# Patient Record
Sex: MALE | Race: BLACK OR AFRICAN AMERICAN | Employment: OTHER | URBAN - METROPOLITAN AREA
[De-identification: names, ages, dates, MRNs, and addresses within clinical notes are randomized per-mention and may not be internally consistent; named-entity substitution may affect disease eponyms.]

---

## 2020-07-16 ENCOUNTER — HOSPITAL ENCOUNTER (OUTPATIENT)
Dept: ULTRASOUND IMAGING | Age: 50
Discharge: HOME | End: 2020-07-16
Payer: COMMERCIAL

## 2020-07-16 PROCEDURE — 76705 ECHO EXAM OF ABDOMEN: CPT

## 2020-07-24 ENCOUNTER — OFFICE VISIT (OUTPATIENT)
Dept: PRIMARY CARE CLINIC | Age: 50
End: 2020-07-24
Payer: COMMERCIAL

## 2020-07-24 ENCOUNTER — APPOINTMENT (OUTPATIENT)
Dept: CT IMAGING | Age: 50
DRG: 469 | End: 2020-07-24
Payer: COMMERCIAL

## 2020-07-24 ENCOUNTER — HOSPITAL ENCOUNTER (INPATIENT)
Age: 50
LOS: 3 days | Discharge: HOME | DRG: 469 | End: 2020-07-27
Attending: EMERGENCY MEDICINE | Admitting: INTERNAL MEDICINE
Payer: COMMERCIAL

## 2020-07-24 ENCOUNTER — HOSPITAL ENCOUNTER (OUTPATIENT)
Dept: GENERAL RADIOLOGY | Age: 50
Discharge: HOME | DRG: 469 | End: 2020-07-24
Payer: COMMERCIAL

## 2020-07-24 ENCOUNTER — HOSPITAL ENCOUNTER (OUTPATIENT)
Age: 50
Discharge: HOME | DRG: 469 | End: 2020-07-24
Payer: COMMERCIAL

## 2020-07-24 VITALS
BODY MASS INDEX: 24.9 KG/M2 | HEART RATE: 60 BPM | HEIGHT: 74 IN | DIASTOLIC BLOOD PRESSURE: 90 MMHG | WEIGHT: 194 LBS | SYSTOLIC BLOOD PRESSURE: 144 MMHG | TEMPERATURE: 97.3 F | OXYGEN SATURATION: 99 %

## 2020-07-24 DIAGNOSIS — R32 URINARY INCONTINENCE, UNSPECIFIED TYPE: ICD-10-CM

## 2020-07-24 DIAGNOSIS — R35.89 POLYURIA: ICD-10-CM

## 2020-07-24 DIAGNOSIS — R10.30 LOWER ABDOMINAL PAIN: ICD-10-CM

## 2020-07-24 DIAGNOSIS — Z82.61 FAMILY HISTORY OF RHEUMATOID ARTHRITIS: ICD-10-CM

## 2020-07-24 DIAGNOSIS — M25.449 SWELLING OF JOINT OF HAND, UNSPECIFIED LATERALITY: ICD-10-CM

## 2020-07-24 PROBLEM — N40.1 BENIGN NODULAR PROSTATIC HYPERPLASIA WITH LOWER URINARY TRACT SYMPTOMS: Status: ACTIVE | Noted: 2017-02-16

## 2020-07-24 PROBLEM — Z87.898 HISTORY OF ELEVATED PSA: Status: ACTIVE | Noted: 2020-07-24

## 2020-07-24 PROBLEM — N17.9 AKI (ACUTE KIDNEY INJURY) (HCC): Status: ACTIVE | Noted: 2020-07-24

## 2020-07-24 PROBLEM — R73.03 PRE-DIABETES: Status: ACTIVE | Noted: 2019-07-31

## 2020-07-24 LAB
A/G RATIO: 1.6 (ref 1.1–2.2)
ALBUMIN SERPL-MCNC: 4.4 G/DL (ref 3.4–5)
ALP BLD-CCNC: 63 U/L (ref 40–129)
ALT SERPL-CCNC: 12 U/L (ref 10–40)
ANION GAP SERPL CALCULATED.3IONS-SCNC: 11 MMOL/L (ref 3–16)
ANTI-NUCLEAR ANTIBODY (ANA): NEGATIVE
AST SERPL-CCNC: 20 U/L (ref 15–37)
BILIRUB SERPL-MCNC: <0.2 MG/DL (ref 0–1)
BILIRUBIN URINE: NEGATIVE
BLOOD, URINE: NEGATIVE
BUN BLDV-MCNC: 35 MG/DL (ref 7–20)
C-REACTIVE PROTEIN: 1.3 MG/L (ref 0–5.1)
CALCIUM SERPL-MCNC: 9.1 MG/DL (ref 8.3–10.6)
CHLORIDE BLD-SCNC: 104 MMOL/L (ref 99–110)
CLARITY: CLEAR
CO2: 23 MMOL/L (ref 21–32)
COLOR: YELLOW
CREAT SERPL-MCNC: 3.4 MG/DL (ref 0.9–1.3)
EPITHELIAL CELLS, UA: 0 /HPF (ref 0–5)
GFR AFRICAN AMERICAN: 23
GFR NON-AFRICAN AMERICAN: 19
GLOBULIN: 2.8 G/DL
GLUCOSE BLD-MCNC: 81 MG/DL (ref 70–99)
GLUCOSE URINE: NEGATIVE MG/DL
HYALINE CASTS: 0 /LPF (ref 0–8)
KETONES, URINE: NEGATIVE MG/DL
LEUKOCYTE ESTERASE, URINE: ABNORMAL
MICROSCOPIC EXAMINATION: YES
NITRITE, URINE: NEGATIVE
PH UA: 6 (ref 5–8)
POTASSIUM SERPL-SCNC: 4.9 MMOL/L (ref 3.5–5.1)
PROTEIN UA: NEGATIVE MG/DL
RBC UA: 3 /HPF (ref 0–4)
RHEUMATOID FACTOR: <10 IU/ML
SODIUM BLD-SCNC: 138 MMOL/L (ref 136–145)
SPECIFIC GRAVITY UA: 1.01 (ref 1–1.03)
TOTAL PROTEIN: 7.2 G/DL (ref 6.4–8.2)
URINE TYPE: ABNORMAL
UROBILINOGEN, URINE: 0.2 E.U./DL
WBC UA: 6 /HPF (ref 0–5)

## 2020-07-24 PROCEDURE — G8427 DOCREV CUR MEDS BY ELIG CLIN: HCPCS | Performed by: FAMILY MEDICINE

## 2020-07-24 PROCEDURE — 3017F COLORECTAL CA SCREEN DOC REV: CPT | Performed by: FAMILY MEDICINE

## 2020-07-24 PROCEDURE — 99204 OFFICE O/P NEW MOD 45 MIN: CPT | Performed by: FAMILY MEDICINE

## 2020-07-24 PROCEDURE — 99285 EMERGENCY DEPT VISIT HI MDM: CPT

## 2020-07-24 PROCEDURE — 6360000002 HC RX W HCPCS: Performed by: PHYSICIAN ASSISTANT

## 2020-07-24 PROCEDURE — 96375 TX/PRO/DX INJ NEW DRUG ADDON: CPT

## 2020-07-24 PROCEDURE — 74019 RADEX ABDOMEN 2 VIEWS: CPT

## 2020-07-24 PROCEDURE — G8419 CALC BMI OUT NRM PARAM NOF/U: HCPCS | Performed by: FAMILY MEDICINE

## 2020-07-24 PROCEDURE — 74176 CT ABD & PELVIS W/O CONTRAST: CPT

## 2020-07-24 PROCEDURE — 96374 THER/PROPH/DIAG INJ IV PUSH: CPT

## 2020-07-24 PROCEDURE — 1200000000 HC SEMI PRIVATE

## 2020-07-24 PROCEDURE — G0378 HOSPITAL OBSERVATION PER HR: HCPCS

## 2020-07-24 PROCEDURE — 51798 US URINE CAPACITY MEASURE: CPT

## 2020-07-24 PROCEDURE — 84153 ASSAY OF PSA TOTAL: CPT

## 2020-07-24 PROCEDURE — 4004F PT TOBACCO SCREEN RCVD TLK: CPT | Performed by: FAMILY MEDICINE

## 2020-07-24 RX ORDER — MORPHINE SULFATE 4 MG/ML
4 INJECTION, SOLUTION INTRAMUSCULAR; INTRAVENOUS ONCE
Status: COMPLETED | OUTPATIENT
Start: 2020-07-24 | End: 2020-07-24

## 2020-07-24 RX ORDER — POLYETHYLENE GLYCOL 3350 17 G/17G
17 POWDER, FOR SOLUTION ORAL DAILY PRN
Status: DISCONTINUED | OUTPATIENT
Start: 2020-07-24 | End: 2020-07-27 | Stop reason: HOSPADM

## 2020-07-24 RX ORDER — PROMETHAZINE HYDROCHLORIDE 25 MG/1
12.5 TABLET ORAL EVERY 6 HOURS PRN
Status: DISCONTINUED | OUTPATIENT
Start: 2020-07-24 | End: 2020-07-27 | Stop reason: HOSPADM

## 2020-07-24 RX ORDER — ONDANSETRON 2 MG/ML
4 INJECTION INTRAMUSCULAR; INTRAVENOUS EVERY 6 HOURS PRN
Status: DISCONTINUED | OUTPATIENT
Start: 2020-07-24 | End: 2020-07-27 | Stop reason: HOSPADM

## 2020-07-24 RX ORDER — TAMSULOSIN HYDROCHLORIDE 0.4 MG/1
CAPSULE ORAL
COMMUNITY
Start: 2019-08-27 | End: 2020-07-24 | Stop reason: ALTCHOICE

## 2020-07-24 RX ORDER — SODIUM CHLORIDE 0.9 % (FLUSH) 0.9 %
10 SYRINGE (ML) INJECTION PRN
Status: DISCONTINUED | OUTPATIENT
Start: 2020-07-24 | End: 2020-07-27 | Stop reason: HOSPADM

## 2020-07-24 RX ORDER — ACETAMINOPHEN 650 MG/1
650 SUPPOSITORY RECTAL EVERY 6 HOURS PRN
Status: DISCONTINUED | OUTPATIENT
Start: 2020-07-24 | End: 2020-07-27 | Stop reason: HOSPADM

## 2020-07-24 RX ORDER — SODIUM CHLORIDE 0.9 % (FLUSH) 0.9 %
10 SYRINGE (ML) INJECTION EVERY 12 HOURS SCHEDULED
Status: DISCONTINUED | OUTPATIENT
Start: 2020-07-25 | End: 2020-07-27 | Stop reason: HOSPADM

## 2020-07-24 RX ORDER — ONDANSETRON 2 MG/ML
4 INJECTION INTRAMUSCULAR; INTRAVENOUS ONCE
Status: COMPLETED | OUTPATIENT
Start: 2020-07-24 | End: 2020-07-24

## 2020-07-24 RX ORDER — ACETAMINOPHEN 325 MG/1
650 TABLET ORAL EVERY 6 HOURS PRN
Status: DISCONTINUED | OUTPATIENT
Start: 2020-07-24 | End: 2020-07-27 | Stop reason: HOSPADM

## 2020-07-24 RX ORDER — SODIUM CHLORIDE 9 MG/ML
INJECTION, SOLUTION INTRAVENOUS CONTINUOUS
Status: ACTIVE | OUTPATIENT
Start: 2020-07-25 | End: 2020-07-25

## 2020-07-24 RX ADMIN — ONDANSETRON 4 MG: 2 INJECTION INTRAMUSCULAR; INTRAVENOUS at 21:47

## 2020-07-24 RX ADMIN — MORPHINE SULFATE 4 MG: 4 INJECTION INTRAVENOUS at 21:47

## 2020-07-24 ASSESSMENT — PAIN DESCRIPTION - PAIN TYPE: TYPE: ACUTE PAIN

## 2020-07-24 ASSESSMENT — ENCOUNTER SYMPTOMS
RHINORRHEA: 0
ABDOMINAL PAIN: 0
NAUSEA: 0
VOMITING: 0
SHORTNESS OF BREATH: 0
SORE THROAT: 0
COUGH: 0

## 2020-07-24 ASSESSMENT — PAIN DESCRIPTION - LOCATION: LOCATION: ABDOMEN

## 2020-07-24 ASSESSMENT — PAIN SCALES - GENERAL
PAINLEVEL_OUTOF10: 4
PAINLEVEL_OUTOF10: 0
PAINLEVEL_OUTOF10: 10

## 2020-07-24 ASSESSMENT — PATIENT HEALTH QUESTIONNAIRE - PHQ9
2. FEELING DOWN, DEPRESSED OR HOPELESS: 0
1. LITTLE INTEREST OR PLEASURE IN DOING THINGS: 0
SUM OF ALL RESPONSES TO PHQ QUESTIONS 1-9: 0
SUM OF ALL RESPONSES TO PHQ QUESTIONS 1-9: 0
SUM OF ALL RESPONSES TO PHQ9 QUESTIONS 1 & 2: 0

## 2020-07-24 NOTE — PROGRESS NOTES
[address] [facility] [address] PRIMARY CARE  [address] [place] [id number]  Dept: [phone]  Dept Fax: [phone]     7/24/2020      Mesha Beth   1970     Chief Complaint   Patient presents with    Other     stomach issue       HPI  Pt comes in today as a NP to establish care. He is seemingly chronically healthy individual, but has been dealing with some \"abdominal issues\" for the last couple of months. He was working with his previous PCP office regarding this. He has been having pain in lower abd, mild bowel changes and increase urination. He has been getting up multiple times per night, and more recently actually reports urinary incontinence at night. He denies blood in urine, polyuria or fever. He had a RUQ sono done very recently and was told it was normal. In chart review the US shows:    Pancreas normal size and configuration. Pancreatic duct is borderline dilated at 3 mm.         Hepatic parenchymal echotexture is uniform. Minimal echogenic sludge is present within the gallbladder. No signs of cholelithiasis. No overlying probe tenderness elicited during examination         Common bile duct normal measuring 3 mm. Right kidney measures 14.7 cm in length with associated moderate hydronephrosis. No intrarenal calculus. In addition renal cortex is hyperechoic consistent with medical renal disease           Further chart review does reveal a slightly elevated PSA for years and had been previously on Flomax. When asked why he no longer takes this he reports it not working.     PHQ Scores 7/24/2020   PHQ2 Score 0   PHQ9 Score 0     Interpretation of Total Score Depression Severity: 1-4 = Minimal depression, 5-9 = Mild depression, 10-14 = Moderate depression, 15-19 = Moderately severe depression, 20-27 = Severe depression     Prior to Visit Medications    Not on File       Past Medical History:   Diagnosis Date    Alcohol abuse     Borderline diabetes     A1c 6.0 7/2019 Appearance: Normal appearance. He is well-developed and normal weight. HENT:      Head: Normocephalic and atraumatic. Right Ear: Tympanic membrane and ear canal normal. No drainage. No middle ear effusion. Tympanic membrane is not erythematous. Left Ear: Tympanic membrane and ear canal normal. No drainage. No middle ear effusion. Tympanic membrane is not erythematous. Nose: Nose normal. No rhinorrhea. Mouth/Throat:      Mouth: Mucous membranes are moist.      Pharynx: No oropharyngeal exudate or posterior oropharyngeal erythema. Eyes:      Extraocular Movements: Extraocular movements intact. Pupils: Pupils are equal, round, and reactive to light. Neck:      Musculoskeletal: Neck supple. Thyroid: No thyromegaly. Cardiovascular:      Rate and Rhythm: Normal rate and regular rhythm. Heart sounds: No murmur. Pulmonary:      Effort: Pulmonary effort is normal.      Breath sounds: Normal breath sounds. No wheezing. Abdominal:      General: Bowel sounds are normal. There is distension (mild, lower). Palpations: Abdomen is soft. There is no mass. Tenderness: There is abdominal tenderness (mild, lower). There is no right CVA tenderness, left CVA tenderness, guarding or rebound. Musculoskeletal: Normal range of motion. General: No swelling. Comments: Multiple enlarged joints to fingers bilateral hands   Lymphadenopathy:      Cervical: No cervical adenopathy. Skin:     General: Skin is warm and dry. Findings: No rash. Neurological:      General: No focal deficit present. Mental Status: He is alert and oriented to person, place, and time. Cranial Nerves: No cranial nerve deficit. Psychiatric:         Mood and Affect: Mood normal.         Assessment:  Encounter Diagnoses   Name Primary?     Lower abdominal pain Yes    Abdominal distention     Urinary incontinence, unspecified type     History of BPH     Pre-diabetes     Polyuria Renay Mchugh, DO     Please note that this chart was generated using dragon dictation software. Although every effort was made to ensure the accuracy of this automated transcription, some errors in transcription may have occurred.

## 2020-07-25 LAB
ANION GAP SERPL CALCULATED.3IONS-SCNC: 10 MMOL/L (ref 3–16)
BUN BLDV-MCNC: 31 MG/DL (ref 7–20)
CALCIUM SERPL-MCNC: 8.9 MG/DL (ref 8.3–10.6)
CHLORIDE BLD-SCNC: 108 MMOL/L (ref 99–110)
CO2: 25 MMOL/L (ref 21–32)
CREAT SERPL-MCNC: 2.9 MG/DL (ref 0.9–1.3)
GFR AFRICAN AMERICAN: 28
GFR NON-AFRICAN AMERICAN: 23
GLUCOSE BLD-MCNC: 94 MG/DL (ref 70–99)
POTASSIUM REFLEX MAGNESIUM: 4.3 MMOL/L (ref 3.5–5.1)
PROSTATE SPECIFIC ANTIGEN: 5.79 NG/ML (ref 0–4)
SODIUM BLD-SCNC: 143 MMOL/L (ref 136–145)
URINE CULTURE, ROUTINE: NORMAL

## 2020-07-25 PROCEDURE — 80048 BASIC METABOLIC PNL TOTAL CA: CPT

## 2020-07-25 PROCEDURE — 1200000000 HC SEMI PRIVATE

## 2020-07-25 PROCEDURE — 2580000003 HC RX 258: Performed by: INTERNAL MEDICINE

## 2020-07-25 PROCEDURE — 83036 HEMOGLOBIN GLYCOSYLATED A1C: CPT

## 2020-07-25 PROCEDURE — G0378 HOSPITAL OBSERVATION PER HR: HCPCS

## 2020-07-25 PROCEDURE — 36415 COLL VENOUS BLD VENIPUNCTURE: CPT

## 2020-07-25 PROCEDURE — 6370000000 HC RX 637 (ALT 250 FOR IP): Performed by: NURSE PRACTITIONER

## 2020-07-25 PROCEDURE — 6370000000 HC RX 637 (ALT 250 FOR IP): Performed by: INTERNAL MEDICINE

## 2020-07-25 PROCEDURE — 6360000002 HC RX W HCPCS: Performed by: INTERNAL MEDICINE

## 2020-07-25 RX ORDER — OXYBUTYNIN CHLORIDE 5 MG/1
5 TABLET ORAL 3 TIMES DAILY
Status: DISCONTINUED | OUTPATIENT
Start: 2020-07-25 | End: 2020-07-27

## 2020-07-25 RX ORDER — OXYBUTYNIN CHLORIDE 5 MG/1
5 TABLET ORAL 3 TIMES DAILY
Status: COMPLETED | OUTPATIENT
Start: 2020-07-25 | End: 2020-07-25

## 2020-07-25 RX ORDER — THIAMINE MONONITRATE (VIT B1) 100 MG
100 TABLET ORAL DAILY
Status: DISCONTINUED | OUTPATIENT
Start: 2020-07-25 | End: 2020-07-27 | Stop reason: HOSPADM

## 2020-07-25 RX ORDER — SODIUM CHLORIDE 9 MG/ML
INJECTION, SOLUTION INTRAVENOUS CONTINUOUS
Status: ACTIVE | OUTPATIENT
Start: 2020-07-25 | End: 2020-07-25

## 2020-07-25 RX ORDER — TAMSULOSIN HYDROCHLORIDE 0.4 MG/1
0.8 CAPSULE ORAL DAILY
Status: DISCONTINUED | OUTPATIENT
Start: 2020-07-25 | End: 2020-07-27 | Stop reason: HOSPADM

## 2020-07-25 RX ORDER — TAMSULOSIN HYDROCHLORIDE 0.4 MG/1
0.4 CAPSULE ORAL DAILY
Status: DISCONTINUED | OUTPATIENT
Start: 2020-07-25 | End: 2020-07-25

## 2020-07-25 RX ORDER — FOLIC ACID 1 MG/1
1 TABLET ORAL DAILY
Status: DISCONTINUED | OUTPATIENT
Start: 2020-07-25 | End: 2020-07-27 | Stop reason: HOSPADM

## 2020-07-25 RX ORDER — HYDRALAZINE HYDROCHLORIDE 20 MG/ML
10 INJECTION INTRAMUSCULAR; INTRAVENOUS EVERY 6 HOURS PRN
Status: DISCONTINUED | OUTPATIENT
Start: 2020-07-25 | End: 2020-07-27 | Stop reason: HOSPADM

## 2020-07-25 RX ORDER — MORPHINE SULFATE 2 MG/ML
2 INJECTION, SOLUTION INTRAMUSCULAR; INTRAVENOUS EVERY 4 HOURS PRN
Status: DISPENSED | OUTPATIENT
Start: 2020-07-25 | End: 2020-07-27

## 2020-07-25 RX ORDER — NICOTINE 21 MG/24HR
1 PATCH, TRANSDERMAL 24 HOURS TRANSDERMAL DAILY
Status: DISCONTINUED | OUTPATIENT
Start: 2020-07-25 | End: 2020-07-27 | Stop reason: HOSPADM

## 2020-07-25 RX ADMIN — FOLIC ACID 1 MG: 1 TABLET ORAL at 09:02

## 2020-07-25 RX ADMIN — SODIUM CHLORIDE: 9 INJECTION, SOLUTION INTRAVENOUS at 10:45

## 2020-07-25 RX ADMIN — SODIUM CHLORIDE: 9 INJECTION, SOLUTION INTRAVENOUS at 00:44

## 2020-07-25 RX ADMIN — HYDRALAZINE HYDROCHLORIDE 10 MG: 20 INJECTION INTRAMUSCULAR; INTRAVENOUS at 00:53

## 2020-07-25 RX ADMIN — OXYBUTYNIN CHLORIDE 5 MG: 5 TABLET ORAL at 23:06

## 2020-07-25 RX ADMIN — MORPHINE SULFATE 2 MG: 2 INJECTION, SOLUTION INTRAMUSCULAR; INTRAVENOUS at 01:11

## 2020-07-25 RX ADMIN — THIAMINE HCL TAB 100 MG 100 MG: 100 TAB at 09:02

## 2020-07-25 RX ADMIN — ENOXAPARIN SODIUM 30 MG: 30 INJECTION SUBCUTANEOUS at 09:02

## 2020-07-25 RX ADMIN — OXYBUTYNIN CHLORIDE 5 MG: 5 TABLET ORAL at 00:52

## 2020-07-25 RX ADMIN — OXYBUTYNIN CHLORIDE 5 MG: 5 TABLET ORAL at 14:44

## 2020-07-25 RX ADMIN — TAMSULOSIN HYDROCHLORIDE 0.8 MG: 0.4 CAPSULE ORAL at 10:23

## 2020-07-25 RX ADMIN — OXYBUTYNIN CHLORIDE 5 MG: 5 TABLET ORAL at 09:02

## 2020-07-25 ASSESSMENT — PAIN DESCRIPTION - FREQUENCY
FREQUENCY: CONTINUOUS

## 2020-07-25 ASSESSMENT — PAIN DESCRIPTION - PROGRESSION
CLINICAL_PROGRESSION: GRADUALLY IMPROVING
CLINICAL_PROGRESSION: GRADUALLY WORSENING
CLINICAL_PROGRESSION: GRADUALLY WORSENING

## 2020-07-25 ASSESSMENT — PAIN DESCRIPTION - PAIN TYPE
TYPE: ACUTE PAIN

## 2020-07-25 ASSESSMENT — PAIN DESCRIPTION - ORIENTATION
ORIENTATION: LOWER
ORIENTATION: LOWER;OTHER (COMMENT)
ORIENTATION: LOWER;OTHER (COMMENT)

## 2020-07-25 ASSESSMENT — PAIN DESCRIPTION - LOCATION
LOCATION: ABDOMEN

## 2020-07-25 ASSESSMENT — PAIN DESCRIPTION - DESCRIPTORS
DESCRIPTORS: SPASM

## 2020-07-25 ASSESSMENT — PAIN SCALES - GENERAL
PAINLEVEL_OUTOF10: 3
PAINLEVEL_OUTOF10: 10
PAINLEVEL_OUTOF10: 0
PAINLEVEL_OUTOF10: 5
PAINLEVEL_OUTOF10: 8
PAINLEVEL_OUTOF10: 0

## 2020-07-25 ASSESSMENT — PAIN - FUNCTIONAL ASSESSMENT
PAIN_FUNCTIONAL_ASSESSMENT: PREVENTS OR INTERFERES SOME ACTIVE ACTIVITIES AND ADLS
PAIN_FUNCTIONAL_ASSESSMENT: ACTIVITIES ARE NOT PREVENTED
PAIN_FUNCTIONAL_ASSESSMENT: PREVENTS OR INTERFERES SOME ACTIVE ACTIVITIES AND ADLS

## 2020-07-25 ASSESSMENT — PAIN DESCRIPTION - ONSET
ONSET: AWAKENED FROM SLEEP
ONSET: ON-GOING
ONSET: ON-GOING

## 2020-07-25 NOTE — PROGRESS NOTES
Admission: Patient received to room 6304 from ED. Patient admitted with Dx LATIA/URINARY RETENTION. Patient A&Ox 4 upon arrival.   No tele order. VSS~ /90. Pt c/o bladder spasm pain. Dr. Sam Aguilar notified via perfect serve. Patient oriented to room, staff, and call system. Educated on fall protocol and hourly rounding. Pt up ad janelle ~ steady gait. Assessment as documented. Admission navigator complete. IVF infusing @ 50, PIV in R FA. Flushed and blood return noted. Bed locked in low position. Patient informed to utilize call light with any needs. Pt verbalized understanding. Call light within reach. Hourly rounding in place. Will continue to monitor.
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
## 2020-07-25 NOTE — PROGRESS NOTES
4 Eyes Admission Assessment     I agree as the admission nurse that 2 RN's have performed a thorough Head to Toe Skin Assessment on the patient. ALL assessment sites listed below have been assessed on admission. Areas assessed by both nurses: yes  [x]   Head, Face, and Ears   [x]   Shoulders, Back, and Chest  [x]   Arms, Elbows, and Hands   [x]   Coccyx, Sacrum, and Ischum  [x]   Legs, Feet, and Heels        Does the Patient have Skin Breakdown?   No         Chad Prevention initiated:  No   Wound Care Orders initiated:  No      [facility] nurse consulted for Pressure Injury (Stage 3,4, Unstageable, DTI, NWPT, and Complex wounds):  No      Nurse 1 eSignature: Electronically signed by Audra Alex RN on 7/25/20 at 12:35 AM EDT    **SHARE this note so that the co-signing nurse is able to place an eSignature**    Nurse 2 eSignature: Electronically signed by Nadiya Fiore RN on 7/25/20 at 12:43 AM EDT

## 2020-07-25 NOTE — PLAN OF CARE
Problem: Pain:  Goal: Control of acute pain  Description: Control of acute pain  Outcome: Ongoing  Note: Pt c/o bladder spasm pain after herrera inserted in ED. Pt requests pain meds~ pt given IV morphine and PO ditropan. Pt expressed relief after pain meds. Will continue to assess. Problem: Tissue Perfusion - Renal, Altered:  Goal: Serum creatinine will be within specified parameters  Description: Serum creatinine will be within specified parameters  Outcome: Ongoing  Note: Creat 3.4 on admission. IVF infusing at 50/hr. Labs will be redrawn this am. Will continue to monitor. Problem: Urinary Elimination:  Goal: Will remain free from infection  Description: Will remain free from infection  Outcome: Ongoing  Note: Herrera inserted for urinary rentention, draining cherry/bloody clear urine. UA (-). Over 2 L of UOP since placement of hrerera. Urology consulted. Herrera care done q shift. Will continue to monitor.

## 2020-07-25 NOTE — PLAN OF CARE
Problem: Pain:  Goal: Pain level will decrease  Description: Pain level will decrease  Outcome: Ongoing  Note: Pt pain level has decreased throughout the shift. Pt has not required and PRN pain medications this shift. Pt endorses some tenderness and soreness in lower abdomen but says it is tolerable and does not require medication. Pt has been able to rest comfortably this shift. Problem: Tissue Perfusion - Renal, Altered:  Goal: Ability to achieve a balanced intake and output will improve  Description: Ability to achieve a balanced intake and output will improve  Note: Schmitz catheter in place. Pt has had large amounts of urine output this shift. Pt is also taking in large quantities of PO water. Urine has become more yellow and less pink this shift. Will continue to track I/Os. Problem: Urinary Elimination:  Goal: Ability to achieve a balanced intake and output will improve  Description: Ability to achieve a balanced intake and output will improve  Note: Schmitz catheter in place. Pt has had large amounts of urine output this shift. Pt is also taking in large quantities of PO water. Urine has become more yellow and less pink this shift. Will continue to track I/Os.

## 2020-07-25 NOTE — CONSULTS
Urology Attending Consult Note      Reason for Consultation: AUR, LATIA, Bilateral hydro. History: 43 yo M with h/o BPH. He was on flomax with PCP, but stopped this several months ago 2/ \"not working\". No f/u after this. He has not been seen by  in the past. He presents with low abdominal/suprapubic pain for the past 2 weeks. A herrera was placed for 1500cc. Cr @ 3.5 on admission. CT showed moderate bilateral hydro. Family History, Social History, Review of Systems:  Reviewed and agreed to as per chart    Vitals:  /87   Pulse 53   Temp 98.7 °F (37.1 °C) (Oral)   Resp 14   Ht 6' 2\" (1.88 m)   Wt 195 lb (88.5 kg)   SpO2 99%   BMI 25.04 kg/m²   Temp  Av.5 °F (36.9 °C)  Min: 98 °F (36.7 °C)  Max: 98.9 °F (37.2 °C)    Intake/Output Summary (Last 24 hours) at 2020 1151  Last data filed at 2020 0908  Gross per 24 hour   Intake 425.21 ml   Output 6625 ml   Net -6199.79 ml         Physical:   Well developed, well nourished in no acute distress   Mood indicates no abnormalities. Pt doesnt appear depressed   Orientated to time and place   Neck is supple, trachea is midline   Respiratory effort is normal   Cardiovascular show no extremity swelling   Abdomen no masses or hernias are palpated, there is no tenderness. Liver and Spleen appear normal.   Skin show no abnormal lesions   Lymph nodes are not palpated in the inguinal, neck, or axillary area.      Male :   Penis appears normal and circumcised   Urethral meatus is normal in size and location   Scrotum appears normal and both testicles appear normal in size and location   Herrera draining clear urine       Labs:  WBC:  No results found for: WBC  Hemoglobin/Hematocrit:  No results found for: HGB, HCT  BMP:    Lab Results   Component Value Date     2020    K 4.3 2020     2020    CO2 25 2020    BUN 31 2020    LABALBU 4.4 2020    CREATININE 2.9 2020    CALCIUM 8.9 07/25/2020    GFRAA 28 07/25/2020    LABGLOM 23 07/25/2020     PT/INR:  No results found for: PROTIME, INR  PTT:  No results found for: APTT[APTT        Urine Culture: NGTD        Antibiotic Therapy: None     Imaging: CT abd/pelvis 7/24/20     1.  Moderate prostatomegaly. Urinary bladder is partially nondistended with a Herrera catheter and demonstrates moderate wall thickening which may represent chronic bladder outlet obstruction or cystitis. 2.  Moderate bilateral hydroureteronephrosis without evidence of obstructing lesion likely secondary to bladder outlet obstruction. 3.  No evidence of renal or ureteral calculi.               Impression/Plan: 45 yo M with BPH, admitted with AUR, LATIA and bilateral hydro.     -herrera was placed for 1500cc. Urine clear   -Cr @ 2.9 from 3.5 with herrera.  Will check renal function in AM  -flomax 0.8mg started   -keep herrera for 4-5 days to allow flomax to take effect  -voiding trial as outpatient     Marquis Meneses, APRN - CNP

## 2020-07-25 NOTE — PROGRESS NOTES
[facility]    7/25/2020 10:17 AM        Name: Angella Foy . Admitted: 7/24/2020  Primary Care Provider: Luis A Navarro DO (Tel: [phone])    Brief Course:  Admitted with seema, bph, post renal renal failure  S/p herrera, urology following      CC: abdominal distension    Subjective:  . Patient feels better than yesterday  But still having some abdominal fullness and pain  No nausea, no vomiting   Blood pressure improve    Reviewed interval ancillary notes    Current Medications  hydrALAZINE (APRESOLINE) injection 10 mg, Q6H PRN  oxybutynin (DITROPAN) tablet 5 mg, TID  morphine (PF) injection 2 mg, Q4H PRN  vitamin B-1 (THIAMINE) tablet 060 mg, Daily  folic acid (FOLVITE) tablet 1 mg, Daily  nicotine (NICODERM CQ) 21 MG/24HR 1 patch, Daily  tamsulosin (FLOMAX) capsule 0.8 mg, Daily  sodium chloride flush 0.9 % injection 10 mL, 2 times per day  sodium chloride flush 0.9 % injection 10 mL, PRN  acetaminophen (TYLENOL) tablet 650 mg, Q6H PRN    Or  acetaminophen (TYLENOL) suppository 650 mg, Q6H PRN  polyethylene glycol (GLYCOLAX) packet 17 g, Daily PRN  promethazine (PHENERGAN) tablet 12.5 mg, Q6H PRN    Or  ondansetron (ZOFRAN) injection 4 mg, Q6H PRN  enoxaparin (LOVENOX) injection 30 mg, Daily        Objective:  /87   Pulse 53   Temp 98.7 °F (37.1 °C) (Oral)   Resp 14   Ht 6' 2\" (1.88 m)   Wt 195 lb (88.5 kg)   SpO2 99%   BMI 25.04 kg/m²     Intake/Output Summary (Last 24 hours) at 7/25/2020 1017  Last data filed at 7/25/2020 0908  Gross per 24 hour   Intake 425.21 ml   Output 6625 ml   Net -6199.79 ml      Wt Readings from Last 3 Encounters:   07/24/20 195 lb (88.5 kg)   07/24/20 194 lb (88 kg)   12/01/16 200 lb (90.7 kg)     Mild abdominal tenderness suprapubic in nature  General appearance:  Appears comfortable  Eyes: Sclera clear. Pupils equal.  ENT: Moist oral mucosa. Trachea midline, no adenopathy. Cardiovascular: Regular rhythm, normal S1, S2. No murmur. No edema in lower extremities  Respiratory: Not using accessory muscles. Good inspiratory effort. Clear to auscultation bilaterally, no wheeze or crackles. G, normal bowel sounds  Musculoskeletal: No cyanosis in digits, neck supple  Neurology: CN 2-12 grossly intact. No speech or motor deficits  Psych: Normal affect. Alert and oriented in time, place and person  Skin: Warm, dry, normal turgor  Extremity exam shows brisk capillary refill. Peripheral pulses are palpable in lower extremities     Labs and Tests:  CBC: No results for input(s): WBC, HGB, PLT in the last 72 hours. BMP:    Recent Labs     07/24/20  1045 07/25/20  0550    143   K 4.9 4.3    108   CO2 23 25   BUN 35* 31*   CREATININE 3.4* 2.9*   GLUCOSE 81 94     Hepatic:   Recent Labs     07/24/20  1045   AST 20   ALT 12   BILITOT <0.2   ALKPHOS 63     CT ABDOMEN PELVIS WO CONTRAST Additional Contrast? None   Final Result      1. Moderate prostatomegaly. Urinary bladder is partially nondistended with a Schmitz catheter and demonstrates moderate wall thickening which may represent chronic bladder outlet obstruction or cystitis. 2.  Moderate bilateral hydroureteronephrosis without evidence of obstructing lesion likely secondary to bladder outlet obstruction. 3.  No evidence of renal or ureteral calculi. Problem List  Active Problems:    LATIA (acute kidney injury) ([facility] [address]  Resolved Problems:    * No resolved hospital problems.  *       Assessment & Plan:   LATIA probably post obstructive  Improving   Urology consulted   Gentle hydration to keep up with the fluid loss     H/o BPH  Was not on flomax     Elevated blood pressure on the arrival   Improved, continue to monitor at this time     IV Access: peripheral  Schmitz: no  Diet: DIET GENERAL;  Code:Full Code  DVT PPX Lovenox, renal dosing  Disposition  Hopefully home tomorrow or day after, monitor the renal functions       Janice Blake MD   7/25/2020 10:17 AM

## 2020-07-25 NOTE — PROGRESS NOTES
Updated pt spouse over the phone. She would like an update from physician if she is not here during rounds.    [phone] [employer]

## 2020-07-26 LAB
ANION GAP SERPL CALCULATED.3IONS-SCNC: 11 MMOL/L (ref 3–16)
BUN BLDV-MCNC: 24 MG/DL (ref 7–20)
CALCIUM SERPL-MCNC: 9.5 MG/DL (ref 8.3–10.6)
CHLORIDE BLD-SCNC: 103 MMOL/L (ref 99–110)
CO2: 26 MMOL/L (ref 21–32)
CREAT SERPL-MCNC: 2.5 MG/DL (ref 0.9–1.3)
ESTIMATED AVERAGE GLUCOSE: 122.6 MG/DL
GFR AFRICAN AMERICAN: 33
GFR NON-AFRICAN AMERICAN: 27
GLUCOSE BLD-MCNC: 93 MG/DL (ref 70–99)
HBA1C MFR BLD: 5.9 %
POTASSIUM REFLEX MAGNESIUM: 4.4 MMOL/L (ref 3.5–5.1)
SODIUM BLD-SCNC: 140 MMOL/L (ref 136–145)

## 2020-07-26 PROCEDURE — 80048 BASIC METABOLIC PNL TOTAL CA: CPT

## 2020-07-26 PROCEDURE — 6370000000 HC RX 637 (ALT 250 FOR IP): Performed by: INTERNAL MEDICINE

## 2020-07-26 PROCEDURE — 36415 COLL VENOUS BLD VENIPUNCTURE: CPT

## 2020-07-26 PROCEDURE — 6370000000 HC RX 637 (ALT 250 FOR IP): Performed by: NURSE PRACTITIONER

## 2020-07-26 PROCEDURE — 2580000003 HC RX 258: Performed by: INTERNAL MEDICINE

## 2020-07-26 PROCEDURE — 6360000002 HC RX W HCPCS: Performed by: INTERNAL MEDICINE

## 2020-07-26 PROCEDURE — G0378 HOSPITAL OBSERVATION PER HR: HCPCS

## 2020-07-26 PROCEDURE — 1200000000 HC SEMI PRIVATE

## 2020-07-26 RX ORDER — SODIUM CHLORIDE 9 MG/ML
INJECTION, SOLUTION INTRAVENOUS CONTINUOUS
Status: DISCONTINUED | OUTPATIENT
Start: 2020-07-26 | End: 2020-07-27 | Stop reason: HOSPADM

## 2020-07-26 RX ADMIN — ENOXAPARIN SODIUM 40 MG: 40 INJECTION SUBCUTANEOUS at 09:57

## 2020-07-26 RX ADMIN — SODIUM CHLORIDE: 9 INJECTION, SOLUTION INTRAVENOUS at 18:35

## 2020-07-26 RX ADMIN — MORPHINE SULFATE 2 MG: 2 INJECTION, SOLUTION INTRAMUSCULAR; INTRAVENOUS at 05:44

## 2020-07-26 RX ADMIN — SODIUM CHLORIDE: 9 INJECTION, SOLUTION INTRAVENOUS at 09:57

## 2020-07-26 RX ADMIN — THIAMINE HCL TAB 100 MG 100 MG: 100 TAB at 09:57

## 2020-07-26 RX ADMIN — OXYBUTYNIN CHLORIDE 5 MG: 5 TABLET ORAL at 20:19

## 2020-07-26 RX ADMIN — TAMSULOSIN HYDROCHLORIDE 0.8 MG: 0.4 CAPSULE ORAL at 09:57

## 2020-07-26 RX ADMIN — OXYBUTYNIN CHLORIDE 5 MG: 5 TABLET ORAL at 14:29

## 2020-07-26 RX ADMIN — POLYETHYLENE GLYCOL 3350 17 G: 17 POWDER, FOR SOLUTION ORAL at 05:53

## 2020-07-26 RX ADMIN — OXYBUTYNIN CHLORIDE 5 MG: 5 TABLET ORAL at 09:57

## 2020-07-26 RX ADMIN — Medication 10 ML: at 09:57

## 2020-07-26 RX ADMIN — FOLIC ACID 1 MG: 1 TABLET ORAL at 09:57

## 2020-07-26 ASSESSMENT — PAIN DESCRIPTION - PAIN TYPE: TYPE: ACUTE PAIN

## 2020-07-26 ASSESSMENT — ENCOUNTER SYMPTOMS
ABDOMINAL DISTENTION: 1
SHORTNESS OF BREATH: 0
WHEEZING: 0
ABDOMINAL PAIN: 0
CONSTIPATION: 0
NAUSEA: 0
VOMITING: 0
DIARRHEA: 0
BLOOD IN STOOL: 0
EYE PAIN: 0
SORE THROAT: 0
COUGH: 0
EYE ITCHING: 0

## 2020-07-26 ASSESSMENT — PAIN SCALES - GENERAL
PAINLEVEL_OUTOF10: 0
PAINLEVEL_OUTOF10: 0
PAINLEVEL_OUTOF10: 8

## 2020-07-26 ASSESSMENT — PAIN DESCRIPTION - LOCATION: LOCATION: ABDOMEN

## 2020-07-26 ASSESSMENT — PAIN DESCRIPTION - FREQUENCY: FREQUENCY: CONTINUOUS

## 2020-07-26 ASSESSMENT — PAIN DESCRIPTION - ORIENTATION: ORIENTATION: LOWER

## 2020-07-26 ASSESSMENT — PAIN DESCRIPTION - ONSET: ONSET: AWAKENED FROM SLEEP

## 2020-07-26 ASSESSMENT — PAIN DESCRIPTION - DESCRIPTORS: DESCRIPTORS: SPASM;PRESSURE

## 2020-07-26 ASSESSMENT — PAIN - FUNCTIONAL ASSESSMENT: PAIN_FUNCTIONAL_ASSESSMENT: ACTIVITIES ARE NOT PREVENTED

## 2020-07-26 ASSESSMENT — PAIN DESCRIPTION - PROGRESSION: CLINICAL_PROGRESSION: NOT CHANGED

## 2020-07-26 NOTE — PLAN OF CARE
Problem: Pain:  Goal: Control of acute pain  Description: Control of acute pain  Outcome: Ongoing  Note: Pt c/o bladder spasm pain. Pt requests ditropan ~ med re-ordered TID and given. No IV morphine needed during shift. Pt expressed relief after ditropan given and resting quietly in bed. Will continue to assess. Problem: Tissue Perfusion - Renal, Altered:  Goal: Serum creatinine will be within specified parameters  Description: Serum creatinine will be within specified parameters  Outcome: Ongoing  Note: Creat 3.4 on admission~ decreased to 2.9. IVF d/c yesterday. Labs will be redrawn this am. Will continue to monitor. Problem: Urinary Elimination:  Goal: Ability to achieve a balanced intake and output will improve  Description: Ability to achieve a balanced intake and output will improve  7/26/2020 0124 by Karoline Mora RN  Outcome: Ongoing  Note: Herrera draining yellow (slightly pink) clear urine. Pt has significant UOP during shift (See flowsheets) and drinks large amounts of water. Herrera care done q shift. Urology following. Plan is for pt to d/c home with herrera and follow up with urology outpt to complete voiding trial in a week. Will continue to monitor.

## 2020-07-26 NOTE — PLAN OF CARE
Problem: Pain:  Goal: Control of acute pain  Description: Control of acute pain  Outcome: Ongoing  Note: Pt has had no complaints of pain this shift. No need for PRN medications. Pt endorses slight tenderness but not pain or bladder spasms. Oxybutynin administered as scheduled and pain remains controlled. Will continue to monitor. Problem: Tissue Perfusion - Renal, Altered:  Goal: Urine creatinine clearance will be within specified parameters  Description: Urine creatinine clearance will be within specified parameters  Outcome: Ongoing  Note: Creatinine still elevated at 2.5, mildly down from 2.9 yesterday. IVF running and patient having good urine output. Problem: Urinary Elimination:  Goal: Will remain free from infection  Description: Will remain free from infection  Outcome: Ongoing  Note: Pt shows no signs of infection. WBC WNL, afebrile, skin intact.

## 2020-07-26 NOTE — PROGRESS NOTES
Pt c/o bladder spasms and requests Ditropan. Dr. Sam Aguilar ordered ditropan TID. Pt given med and instructed to call RN if pain becomes intolerable. VSS~ herrera draining well. Pt denies needs at this time. Will continue to monitor.

## 2020-07-26 NOTE — PROGRESS NOTES
distress; resting comfortably in hospital bed  Neuro: alert; oriented x3; normal speech  Head: NCAT  Eyes: sclera non icteric; conjunctiva non-injected  Neck: supple; full AROM  CV: no peripheral edema  Pulm: relaxed resp; symmetric chest rise  Abd: soft, NT, ND  MSK: no CVA ttp bilaterally      Labs:  WBC:  No results found for: WBC  Hemoglobin/Hematocrit:  No results found for: HGB, HCT  BMP:    Lab Results   Component Value Date     07/26/2020    K 4.4 07/26/2020     07/26/2020    CO2 26 07/26/2020    BUN 24 07/26/2020    LABALBU 4.4 07/24/2020    CREATININE 2.5 07/26/2020    CALCIUM 9.5 07/26/2020    GFRAA 33 07/26/2020    LABGLOM 27 07/26/2020     PT/INR:  No results found for: PROTIME, INR  PTT:  No results found for: APTT[APTT    CULTURES  URINE CULTURE  Results for orders placed or performed in visit on 07/24/20   Culture, Urine    Specimen: Urine, clean catch   Result Value Ref Range    Urine Culture, Routine No growth at 18 to 36 hours        BLOOD CULTURE  No results found for this or any previous visit.          Juan J Bridges  2/01/26801:87 PM

## 2020-07-26 NOTE — PROGRESS NOTES
[facility]    7/26/2020 1:12 PM        Name: Hans Hooker . Admitted: 7/24/2020  Primary Care Provider: Jesus Guillermo DO (Tel: [phone])    Brief Course:  Admitted with seema, bph, post renal renal failure  S/p herrera, urology following      CC: abdominal distension    Subjective:  .     Patient feels better than yesterday, having significant diuresis, mentions abdominal discomfort is getting better, made 8006+2340+8108 urine in the last 3 shifts, mentions drinking a lot of water    Reviewed interval ancillary notes    Current Medications  0.9 % sodium chloride infusion, Continuous  hydrALAZINE (APRESOLINE) injection 10 mg, Q6H PRN  morphine (PF) injection 2 mg, Q4H PRN  vitamin B-1 (THIAMINE) tablet 549 mg, Daily  folic acid (FOLVITE) tablet 1 mg, Daily  nicotine (NICODERM CQ) 21 MG/24HR 1 patch, Daily  tamsulosin (FLOMAX) capsule 0.8 mg, Daily  enoxaparin (LOVENOX) injection 40 mg, Daily  oxybutynin (DITROPAN) tablet 5 mg, TID  sodium chloride flush 0.9 % injection 10 mL, 2 times per day  sodium chloride flush 0.9 % injection 10 mL, PRN  acetaminophen (TYLENOL) tablet 650 mg, Q6H PRN    Or  acetaminophen (TYLENOL) suppository 650 mg, Q6H PRN  polyethylene glycol (GLYCOLAX) packet 17 g, Daily PRN  promethazine (PHENERGAN) tablet 12.5 mg, Q6H PRN    Or  ondansetron (ZOFRAN) injection 4 mg, Q6H PRN        Objective:  BP (!) 139/90   Pulse 50   Temp 99.1 °F (37.3 °C) (Oral)   Resp 16   Ht 6' 2\" (1.88 m)   Wt 195 lb (88.5 kg)   SpO2 100%   BMI 25.04 kg/m²     Intake/Output Summary (Last 24 hours) at 7/26/2020 1312  Last data filed at 7/26/2020 1003  Gross per 24 hour   Intake 2376.4 ml   Output 7825 ml   Net -5448.6 ml      Wt Readings from Last 3 Encounters:   07/24/20 195 lb (88.5 kg)   07/24/20 194 lb (88 kg)   12/01/16 200 lb (90.7 kg)     Mild abdominal tenderness suprapubic in nature  General appearance:  Appears comfortable  Eyes: Sclera clear. Pupils equal.  ENT: Moist oral mucosa. Trachea midline, no adenopathy. Cardiovascular: Regular rhythm, normal S1, S2. No murmur. No edema in lower extremities  Respiratory: Not using accessory muscles. Good inspiratory effort. Clear to auscultation bilaterally, no wheeze or crackles. G, normal bowel sounds  Musculoskeletal: No cyanosis in digits, neck supple  Neurology: CN 2-12 grossly intact. No speech or motor deficits  Psych: Normal affect. Alert and oriented in time, place and person  Skin: Warm, dry, normal turgor  Extremity exam shows brisk capillary refill. Peripheral pulses are palpable in lower extremities     Labs and Tests:  CBC: No results for input(s): WBC, HGB, PLT in the last 72 hours. BMP:    Recent Labs     07/24/20  1045 07/25/20  0550 07/26/20  0624    143 140   K 4.9 4.3 4.4    108 103   CO2 23 25 26   BUN 35* 31* 24*   CREATININE 3.4* 2.9* 2.5*   GLUCOSE 81 94 93     Hepatic:   Recent Labs     07/24/20  1045   AST 20   ALT 12   BILITOT <0.2   ALKPHOS 63     CT ABDOMEN PELVIS WO CONTRAST Additional Contrast? None   Final Result      1. Moderate prostatomegaly. Urinary bladder is partially nondistended with a Schmitz catheter and demonstrates moderate wall thickening which may represent chronic bladder outlet obstruction or cystitis. 2.  Moderate bilateral hydroureteronephrosis without evidence of obstructing lesion likely secondary to bladder outlet obstruction. 3.  No evidence of renal or ureteral calculi. Problem List  Active Problems:    LATIA (acute kidney injury) ([facility] [address]  Resolved Problems:    * No resolved hospital problems.  *       Assessment & Plan:   LTAIA probably post obstructive  Improving   Urology consulted   Gentle hydration to keep up with the fluid loss, still making significant amount of urine, I would also consult nephrology as his improvement of renal function is slow, concerned of permanent renal injury with concerns of obstructive nephropathy, repeat labs in the morning    H/o BPH  Was not on flomax, Flomax started    Elevated blood pressure on the arrival   Improved, continue to monitor at this time     IV Access: peripheral  Schmitz: no  Diet: DIET GENERAL;  Code:Full Code  DVT PPX Lovenox, renal dosing  Disposition hopefully discharge home tomorrow      Michael Corral MD   7/26/2020 1:12 PM

## 2020-07-27 ENCOUNTER — TELEPHONE (OUTPATIENT)
Dept: PRIMARY CARE CLINIC | Age: 50
End: 2020-07-27

## 2020-07-27 VITALS
SYSTOLIC BLOOD PRESSURE: 130 MMHG | TEMPERATURE: 97.9 F | WEIGHT: 195 LBS | HEIGHT: 74 IN | BODY MASS INDEX: 25.03 KG/M2 | OXYGEN SATURATION: 100 % | HEART RATE: 52 BPM | RESPIRATION RATE: 16 BRPM | DIASTOLIC BLOOD PRESSURE: 74 MMHG

## 2020-07-27 LAB
ANION GAP SERPL CALCULATED.3IONS-SCNC: 12 MMOL/L (ref 3–16)
BUN BLDV-MCNC: 24 MG/DL (ref 7–20)
CALCIUM SERPL-MCNC: 8.9 MG/DL (ref 8.3–10.6)
CHLORIDE BLD-SCNC: 103 MMOL/L (ref 99–110)
CO2: 24 MMOL/L (ref 21–32)
CREAT SERPL-MCNC: 2.1 MG/DL (ref 0.9–1.3)
CREATININE URINE: 39.5 MG/DL (ref 39–259)
GFR AFRICAN AMERICAN: 40
GFR NON-AFRICAN AMERICAN: 33
GLUCOSE BLD-MCNC: 93 MG/DL (ref 70–99)
POTASSIUM REFLEX MAGNESIUM: 4.4 MMOL/L (ref 3.5–5.1)
PROTEIN PROTEIN: 49.8 MG/DL
SODIUM BLD-SCNC: 139 MMOL/L (ref 136–145)
SODIUM URINE: 60 MMOL/L

## 2020-07-27 PROCEDURE — 6370000000 HC RX 637 (ALT 250 FOR IP): Performed by: INTERNAL MEDICINE

## 2020-07-27 PROCEDURE — 84300 ASSAY OF URINE SODIUM: CPT

## 2020-07-27 PROCEDURE — 36415 COLL VENOUS BLD VENIPUNCTURE: CPT

## 2020-07-27 PROCEDURE — 1200000000 HC SEMI PRIVATE

## 2020-07-27 PROCEDURE — 82570 ASSAY OF URINE CREATININE: CPT

## 2020-07-27 PROCEDURE — 80048 BASIC METABOLIC PNL TOTAL CA: CPT

## 2020-07-27 PROCEDURE — 6370000000 HC RX 637 (ALT 250 FOR IP): Performed by: NURSE PRACTITIONER

## 2020-07-27 PROCEDURE — G0378 HOSPITAL OBSERVATION PER HR: HCPCS

## 2020-07-27 PROCEDURE — 2580000003 HC RX 258: Performed by: INTERNAL MEDICINE

## 2020-07-27 PROCEDURE — 84156 ASSAY OF PROTEIN URINE: CPT

## 2020-07-27 PROCEDURE — 6360000002 HC RX W HCPCS: Performed by: INTERNAL MEDICINE

## 2020-07-27 PROCEDURE — 83883 ASSAY NEPHELOMETRY NOT SPEC: CPT

## 2020-07-27 RX ORDER — TAMSULOSIN HYDROCHLORIDE 0.4 MG/1
0.8 CAPSULE ORAL DAILY
Qty: 30 CAPSULE | Refills: 3 | Status: ON HOLD | OUTPATIENT
Start: 2020-07-28 | End: 2020-09-17

## 2020-07-27 RX ORDER — NICOTINE 21 MG/24HR
1 PATCH, TRANSDERMAL 24 HOURS TRANSDERMAL DAILY
Qty: 30 PATCH | Refills: 3 | Status: SHIPPED | OUTPATIENT
Start: 2020-07-28 | End: 2020-08-28 | Stop reason: ALTCHOICE

## 2020-07-27 RX ADMIN — TAMSULOSIN HYDROCHLORIDE 0.8 MG: 0.4 CAPSULE ORAL at 09:20

## 2020-07-27 RX ADMIN — FOLIC ACID 1 MG: 1 TABLET ORAL at 09:20

## 2020-07-27 RX ADMIN — Medication 10 ML: at 09:22

## 2020-07-27 RX ADMIN — THIAMINE HCL TAB 100 MG 100 MG: 100 TAB at 09:20

## 2020-07-27 RX ADMIN — OXYBUTYNIN CHLORIDE 5 MG: 5 TABLET ORAL at 09:20

## 2020-07-27 RX ADMIN — ENOXAPARIN SODIUM 40 MG: 40 INJECTION SUBCUTANEOUS at 09:20

## 2020-07-27 RX ADMIN — POLYETHYLENE GLYCOL 3350 17 G: 17 POWDER, FOR SOLUTION ORAL at 04:34

## 2020-07-27 ASSESSMENT — PAIN SCALES - GENERAL
PAINLEVEL_OUTOF10: 0

## 2020-07-27 NOTE — PROGRESS NOTES
Discussed discharge papers with pt and follow ups and meds and gave pt a leg bag for herrera and a canister and discussed how to clean the herrera and how to empty it and gave hand out- pt verbalized understanding. Iv removed. Pt stable for discharge home. Awaiting for pt's transportation to arrive.

## 2020-07-27 NOTE — DISCHARGE SUMMARY
Hospital Medicine Discharge Summary    Patient ID: Hans Hooker      Patient's PCP: Jesus Guillermo DO    Admit Date: 7/24/2020     Discharge Date:   07/27/20    Admitting Physician: Carol Mota MD     Discharge Physician: Sia Yarbrough MD     Discharge Diagnoses: Active Hospital Problems    Diagnosis Date Noted    LATIA (acute kidney injury) ([facility] [address] [N17.9] 07/24/2020       The patient was seen and examined on day of discharge and this discharge summary is in conjunction with any daily progress note from day of discharge. Hospital Course:   Patient was admitted and treated for following:    LATIA:  Was likely postobstructive. Nephrology and urology was consulted. Creatinine was monitored. IV fluids were given and continued per nephrology recommendation. Schmitz catheter was placed and Flomax was started per urology. Creatinine improved. Flomax dose was increased. Oxybutynin was discontinued. Patient to continue Schmitz on discharge and follow-up with urology as an outpatient. BPH:  Patient was not on Flomax. Flomax was started and dose was increased. Patient follow-up with urology as an outpatient    History of alcohol abuse/tobacco abuse:  Patient was placed on CIWA protocol. Nicotine patch was given. Patient is being discharged in stable condition with recommendation to follow-up with urology, nephrology and PCP. Physical Exam Performed:     /74   Pulse 52   Temp 97.9 °F (36.6 °C) (Oral)   Resp 16   Ht 6' 2\" (1.88 m)   Wt 195 lb (88.5 kg)   SpO2 100%   BMI 25.04 kg/m²       General appearance:  No apparent distress, appears stated age and cooperative. HEENT:  Normal cephalic, atraumatic without obvious deformity. Pupils equal, round, and reactive to light. Extra ocular muscles intact. Conjunctivae/corneas clear. Neck: Supple, with full range of motion. No jugular venous distention. Trachea midline. Respiratory:  Normal respiratory effort. Clear to auscultation, bilaterally without Rales/Wheezes/Rhonchi. Cardiovascular:  Regular rate and rhythm with normal S1/S2 without murmurs, rubs or gallops. Abdomen: Soft, non-tender, non-distended with normal bowel sounds. Musculoskeletal:  No clubbing, cyanosis or edema bilaterally. Full range of motion without deformity. Skin: Skin color, texture, turgor normal.  No rashes or lesions. Neurologic:  Neurovascularly intact without any focal sensory/motor deficits. Cranial nerves: II-XII intact, grossly non-focal.  Psychiatric:  Alert and oriented, thought content appropriate, normal insight  Capillary Refill: Brisk,< 3 seconds   Peripheral Pulses: +2 palpable, equal bilaterally       Labs: For convenience and continuity at follow-up the following most recent labs are provided:      CBC:  No results found for: WBC, HGB, HCT, PLT    Renal:    Lab Results   Component Value Date     07/27/2020    K 4.4 07/27/2020     07/27/2020    CO2 24 07/27/2020    BUN 24 07/27/2020    CREATININE 2.1 07/27/2020    CALCIUM 8.9 07/27/2020         Significant Diagnostic Studies    Radiology:   CT ABDOMEN PELVIS WO CONTRAST Additional Contrast? None   Final Result      1. Moderate prostatomegaly. Urinary bladder is partially nondistended with a Schmitz catheter and demonstrates moderate wall thickening which may represent chronic bladder outlet obstruction or cystitis. 2.  Moderate bilateral hydroureteronephrosis without evidence of obstructing lesion likely secondary to bladder outlet obstruction. 3.  No evidence of renal or ureteral calculi. Consults:     IP CONSULT TO HOSPITALIST  IP CONSULT TO UROLOGY  IP CONSULT TO NEPHROLOGY    Disposition: Home    Condition at Discharge: Stable    Discharge Instructions/Follow-up:  follow-up with urology, nephrology and PCP.     Code Status:  Full Code     Activity: activity as tolerated    Diet: regular diet      Discharge Medications:     Current Discharge Medication List           Details   tamsulosin (FLOMAX) 0.4 MG capsule Take 2 capsules by mouth daily  Qty: 30 capsule, Refills: 3      nicotine (NICODERM CQ) 21 MG/24HR Place 1 patch onto the skin daily  Qty: 30 patch, Refills: 3             Time Spent on discharge is more than 30 minutes in the examination, evaluation, counseling and review of medications and discharge plan. Signed:    Rachel Starkey MD   7/27/2020      Thank you Rosetta Amaro DO for the opportunity to be involved in this patient's care. If you have any questions or concerns please feel free to contact me at [phone].

## 2020-07-27 NOTE — TELEPHONE ENCOUNTER
Please contact pt and schedule a hospital follow up - we can do this virtually if pt is able. Should be scheduled with me in next 1-2 weeks. He needs to make sure he schedules f/u with Urology as they directed with his catheter in place.
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
## 2020-07-27 NOTE — PROGRESS NOTES
Urology Attending Progress Note      Subjective: no complaints     Vitals:  /68   Pulse 92   Temp 97.3 °F (36.3 °C) (Oral)   Resp 16   Ht 6' 2\" (1.88 m)   Wt 195 lb (88.5 kg)   SpO2 99%   BMI 25.04 kg/m²   Temp  Av.5 °F (36.9 °C)  Min: 97.3 °F (36.3 °C)  Max: 99.3 °F (37.4 °C)    Intake/Output Summary (Last 24 hours) at 2020 1000  Last data filed at 2020 0906  Gross per 24 hour   Intake 3601.28 ml   Output 6850 ml   Net -3248.72 ml       Exam: abd soft and non-tender. Herrera draining clear urine     Labs:  WBC:  No results found for: WBC  Hemoglobin/Hematocrit:  No results found for: HGB, HCT  BMP:    Lab Results   Component Value Date     2020    K 4.4 2020     2020    CO2 24 2020    BUN 24 2020    LABALBU 4.4 2020    CREATININE 2.1 2020    CALCIUM 8.9 2020    GFRAA 40 2020    LABGLOM 33 2020     PT/INR:  No results found for: PROTIME, INR  PTT:  No results found for: APTT[APTT        Urine Culture:  NGTD      Antibiotic Therapy:  None     Imaging: CT abd/pelvis 20      1.  Moderate prostatomegaly. Urinary bladder is partially nondistended with a Herrera catheter and demonstrates moderate wall thickening which may represent chronic bladder outlet obstruction or cystitis. 2.  Moderate bilateral hydroureteronephrosis without evidence of obstructing lesion likely secondary to bladder outlet obstruction. 3.  No evidence of renal or ureteral calculi. Impression/Plan: 43 yo M with BPH, admitted with AUR, LATIA and bilateral hydro.      -herrera was placed for 1500cc. Urine clear   -Cr @ 2.1 from 3.5 with herrera.   -continue flomax 0.8mg. May consider adding pyridium for spasms   -stop Ditropan   -keep herrera for 2-3 days to allow flomax to take effect  -voiding trial as outpatient   -okay to discharge with herrera from our standpoint     Kole Marshall, [address] [place] [address]

## 2020-07-27 NOTE — PLAN OF CARE
Problem: Pain:  Goal: Pain level will decrease  Description: Pain level will decrease  Outcome: Ongoing  Note: Pt denies pain/discomfort during shift. Pt given ditropan TID to prevent bladder spasms. Pt is resting quietly in bed with eyes closed. Will continue to assess. Problem: Tissue Perfusion - Renal, Altered:  Goal: Serum creatinine will be within specified parameters  Description: Serum creatinine will be within specified parameters  Outcome: Ongoing  Note: Creat 3.4 on admission~ decreased to 2.5. IVF restarted yesterday @ 100/hr. Labs will be redrawn this am. Nephro consulted and urine studies ordered. Will continue to monitor. Problem: Tissue Perfusion - Renal, Altered:  Goal: Ability to achieve a balanced intake and output will improve  Description: Ability to achieve a balanced intake and output will improve  Note: Herrera draining yellow (slightly pink) clear urine. Pt has significant UOP during shift (See flowsheets) and drinks large amounts of water. Pt educated on importance of herrera care BID to prevent infection, pt verbalized understanding and will do independently. Urology following. Plan is for pt to d/c home with herrera and follow up with urology outpt to complete voiding trial in a week. Will continue to monitor.

## 2020-07-27 NOTE — PLAN OF CARE
Problem: Pain:  Goal: Pain level will decrease  Description: Pain level will decrease  7/27/2020 1241 by Zuri Stone RN  Outcome: Ongoing  Note: Pt denies pain at this time. Will continue to monitor. Problem: Tissue Perfusion - Renal, Altered:  Goal: Ability to achieve a balanced intake and output will improve  Description: Ability to achieve a balanced intake and output will improve  7/27/2020 1241 by Zuri Stone RN  Outcome: Ongoing  Note: Pt has been making adequate urine via herrera. Taught pt and his female visitor how to care for the herrera and how to change to leg bag and gave him a leg bag. Will monitor. Problem: Urinary Elimination:  Goal: Ability to achieve a balanced intake and output will improve  Description: Ability to achieve a balanced intake and output will improve  7/27/2020 1241 by Zuri Stone RN  Outcome: Ongoing  Note: Pt has been making adequate urine via herrera. Taught pt and his female visitor how to care for the herrera and how to change to leg bag and gave him a leg bag. Will monitor. Problem: Falls - Risk of:  Goal: Will remain free from falls  Description: Will remain free from falls  Outcome: Ongoing  Note: Discussed with pt importance to keep bed low and locked with alarm activated, nonskid socks on when out of bed, call light and belongings within reach- will monitor.
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
## 2020-07-28 LAB
KAPPA, FREE LIGHT CHAINS, SERUM: 29.45 MG/L (ref 3.3–19.4)
KAPPA/LAMBDA RATIO: 2 (ref 0.26–1.65)
KAPPA/LAMBDA TEST COMMENT: ABNORMAL
LAMBDA, FREE LIGHT CHAINS, SERUM: 14.71 MG/L (ref 5.71–26.3)
PROSTATE SPECIFIC ANTIGEN FREE: 0.7 UG/L
PROSTATE SPECIFIC ANTIGEN PERCENT FREE: 14 %
PROSTATE SPECIFIC ANTIGEN: 5 UG/L (ref 0–4)

## 2020-08-03 ENCOUNTER — VIRTUAL VISIT (OUTPATIENT)
Dept: PRIMARY CARE CLINIC | Age: 50
End: 2020-08-03
Payer: COMMERCIAL

## 2020-08-03 PROBLEM — R97.20 ELEVATED PSA: Status: ACTIVE | Noted: 2020-07-24

## 2020-08-03 PROBLEM — N40.1 BPH WITH OBSTRUCTION/LOWER URINARY TRACT SYMPTOMS: Status: ACTIVE | Noted: 2020-08-03

## 2020-08-03 PROBLEM — F17.210 LIGHT CIGARETTE SMOKER: Status: ACTIVE | Noted: 2020-08-03

## 2020-08-03 PROBLEM — N13.8 BPH WITH OBSTRUCTION/LOWER URINARY TRACT SYMPTOMS: Status: ACTIVE | Noted: 2020-08-03

## 2020-08-03 PROCEDURE — 99214 OFFICE O/P EST MOD 30 MIN: CPT | Performed by: FAMILY MEDICINE

## 2020-08-03 PROCEDURE — 3017F COLORECTAL CA SCREEN DOC REV: CPT | Performed by: FAMILY MEDICINE

## 2020-08-03 PROCEDURE — 1111F DSCHRG MED/CURRENT MED MERGE: CPT | Performed by: FAMILY MEDICINE

## 2020-08-03 PROCEDURE — G8427 DOCREV CUR MEDS BY ELIG CLIN: HCPCS | Performed by: FAMILY MEDICINE

## 2020-08-03 ASSESSMENT — ENCOUNTER SYMPTOMS
CONSTIPATION: 0
ABDOMINAL PAIN: 0
SORE THROAT: 0
EYE PAIN: 0
WHEEZING: 0
VOMITING: 0
SHORTNESS OF BREATH: 0
EYE ITCHING: 0
COUGH: 0
NAUSEA: 0
DIARRHEA: 0

## 2020-08-03 NOTE — PROGRESS NOTES
[address] PRIMARY CARE  [address] [facility] [address]  Dept: [phone]  Dept Fax: [phone]     Post-Discharge Transitional Care Management Services or Hospital Follow Up      Ricardo Lucas   YOB: 1970    Date of Office Visit:  8/3/2020  Date of Hospital Admission: 7/24/20  Date of Hospital Discharge: 7/27/20  Readmission Risk Score(high >=14%.  Medium >=10%):Readmission Risk Score: 8      Care management risk score Rising risk (score 2-5) and Complex Care (Scores >=6): 0     Non face to face  following discharge, date last encounter closed (first attempt may have been earlier): *No documented post hospital discharge outreach found in the last 14 days *No documented post hospital discharge outreach found in the last 14 days    Call initiated 2 business days of discharge: *No response recorded in the last 14 days     Patient Active Problem List   Diagnosis    Elevated PSA    Family history of rheumatoid arthritis - brother    LATIA (acute kidney injury) ([facility] [address]    BPH with obstruction/lower urinary tract symptoms    Light cigarette smoker       No Known Allergies    Medications listed as ordered at the time of discharge from hospital   Catrina Block, [facility] Medication Instructions GISELLA:    Printed on:08/03/20 1910   Medication Information                      nicotine (NICODERM CQ) 21 MG/24HR  Place 1 patch onto the skin daily             tamsulosin (FLOMAX) 0.4 MG capsule  Take 2 capsules by mouth daily                   Medications marked \"taking\" at this time  Outpatient Medications Marked as Taking for the 8/3/20 encounter (Virtual Visit) with Kristian Mccormack DO   Medication Sig Dispense Refill    tamsulosin (FLOMAX) 0.4 MG capsule Take 2 capsules by mouth daily 30 capsule 3    nicotine (NICODERM CQ) 21 MG/24HR Place 1 patch onto the skin daily 30 patch 3        Medications patient taking as of now reconciled against medications Normal appearance. He is not toxic-appearing. HENT:      Head: Normocephalic and atraumatic. Eyes:      Extraocular Movements: Extraocular movements intact. Conjunctiva/sclera: Conjunctivae normal.   Pulmonary:      Effort: Pulmonary effort is normal.   Neurological:      General: No focal deficit present. Mental Status: He is alert and oriented to person, place, and time. Psychiatric:         Mood and Affect: Mood normal.         Thought Content: Thought content normal.       No results found for: WBC, HGB, HCT, MCV, PLT  Lab Results   Component Value Date     07/27/2020    K 4.4 07/27/2020     07/27/2020    CO2 24 07/27/2020    BUN 24 (H) 07/27/2020    CREATININE 2.1 (H) 07/27/2020    GLUCOSE 93 07/27/2020    CALCIUM 8.9 07/27/2020    PROT 7.2 07/24/2020    LABALBU 4.4 07/24/2020    BILITOT <0.2 07/24/2020    ALKPHOS 63 07/24/2020    AST 20 07/24/2020    ALT 12 07/24/2020    LABGLOM 33 (A) 07/27/2020    GFRAA 40 (A) 07/27/2020    AGRATIO 1.6 07/24/2020    GLOB 2.8 07/24/2020     Lab Results   Component Value Date    LABA1C 5.9 07/25/2020       Assessment/Plan:  1. BPH with obstruction/lower urinary tract symptoms  Recent inpatient hospitalization for complications of BPH. Patient is Gene following up with urology as directed on discharge. He has had the Schmitz removed, and now has equipment for home self caths as needed. Patient reports significant improvements from when I saw him in the clinic last week. Continue per urology recommendations.  - SD DISCHARGE MEDS RECONCILED W/ CURRENT OUTPATIENT MED LIST    2. Hospital discharge follow-up  Patient with recent hospitalization. Reviewed progress notes, labs, imaging and any procedure notes prior to visit. Discussed these findings in detail with patient, and updated on any pending results at time of discharge. Reviewed discharge summary including medications and follow-up plans. Patient is improved from time of discharge.     3. LATIA (acute kidney injury) ([facility] [address]  Improving at time of discharge. Patient is scheduled to see nephrology next month. Continue to encourage close monitoring of symptoms and good oral hydration. 4. Elevated PSA  It sounds like he and urology has started having some discussion of possible surgical intervention. 5. Light cigarette smoker  Patient was counseled on tobacco cessation. Based upon patient's motivation to change his behavior, the following plan was agreed upon: patient is not ready to work toward tobacco cessation at this time. He was provided with a list of local tobacco cessation resources. Provider spent 1 minutes counseling patient. Medical Decision Making: moderate complexity     Return in about 3 months (around 11/3/2020) for F/U Chronic Issues. Gino Gilbert, DO    Please note that this chart was generated using dragon dictation software. Although every effort was made to ensure the accuracy of this automated transcription, some errors in transcription may have occurred.

## 2020-08-28 ENCOUNTER — HOSPITAL ENCOUNTER (OUTPATIENT)
Dept: GENERAL RADIOLOGY | Age: 50
Discharge: HOME | End: 2020-08-28
Payer: COMMERCIAL

## 2020-08-28 ENCOUNTER — OFFICE VISIT (OUTPATIENT)
Dept: PRIMARY CARE CLINIC | Age: 50
End: 2020-08-28
Payer: COMMERCIAL

## 2020-08-28 ENCOUNTER — HOSPITAL ENCOUNTER (OUTPATIENT)
Age: 50
Discharge: HOME | End: 2020-08-28
Payer: COMMERCIAL

## 2020-08-28 VITALS
DIASTOLIC BLOOD PRESSURE: 58 MMHG | BODY MASS INDEX: 26.49 KG/M2 | HEART RATE: 52 BPM | HEIGHT: 72 IN | TEMPERATURE: 97.5 F | OXYGEN SATURATION: 99 % | WEIGHT: 195.6 LBS | SYSTOLIC BLOOD PRESSURE: 110 MMHG

## 2020-08-28 DIAGNOSIS — N18.30 CKD (CHRONIC KIDNEY DISEASE) STAGE 3, GFR 30-59 ML/MIN (HCC): ICD-10-CM

## 2020-08-28 LAB
ANION GAP SERPL CALCULATED.3IONS-SCNC: 10 MMOL/L (ref 3–16)
BUN BLDV-MCNC: 15 MG/DL (ref 7–20)
CALCIUM SERPL-MCNC: 9.4 MG/DL (ref 8.3–10.6)
CHLORIDE BLD-SCNC: 103 MMOL/L (ref 99–110)
CO2: 25 MMOL/L (ref 21–32)
CREAT SERPL-MCNC: 1.4 MG/DL (ref 0.9–1.3)
GFR AFRICAN AMERICAN: >60
GFR NON-AFRICAN AMERICAN: 53
GLUCOSE BLD-MCNC: 93 MG/DL (ref 70–99)
POTASSIUM SERPL-SCNC: 4.5 MMOL/L (ref 3.5–5.1)
SODIUM BLD-SCNC: 138 MMOL/L (ref 136–145)

## 2020-08-28 PROCEDURE — 3017F COLORECTAL CA SCREEN DOC REV: CPT | Performed by: FAMILY MEDICINE

## 2020-08-28 PROCEDURE — 4004F PT TOBACCO SCREEN RCVD TLK: CPT | Performed by: FAMILY MEDICINE

## 2020-08-28 PROCEDURE — 99214 OFFICE O/P EST MOD 30 MIN: CPT | Performed by: FAMILY MEDICINE

## 2020-08-28 PROCEDURE — 73120 X-RAY EXAM OF HAND: CPT

## 2020-08-28 PROCEDURE — G8419 CALC BMI OUT NRM PARAM NOF/U: HCPCS | Performed by: FAMILY MEDICINE

## 2020-08-28 PROCEDURE — G8427 DOCREV CUR MEDS BY ELIG CLIN: HCPCS | Performed by: FAMILY MEDICINE

## 2020-08-28 PROCEDURE — 73562 X-RAY EXAM OF KNEE 3: CPT

## 2020-08-28 ASSESSMENT — ENCOUNTER SYMPTOMS
EYE PAIN: 0
CONSTIPATION: 0
DIARRHEA: 0
SORE THROAT: 0
SHORTNESS OF BREATH: 0
NAUSEA: 0
WHEEZING: 0
ABDOMINAL PAIN: 0
EYE ITCHING: 0
COUGH: 0
VOMITING: 0
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
## 2020-08-31 PROBLEM — M19.041 ARTHRITIS OF BOTH HANDS: Status: ACTIVE | Noted: 2020-08-31

## 2020-08-31 PROBLEM — M19.042 ARTHRITIS OF BOTH HANDS: Status: ACTIVE | Noted: 2020-08-31

## 2020-08-31 PROBLEM — M17.12 ARTHRITIS OF LEFT KNEE: Status: ACTIVE | Noted: 2020-08-31

## 2020-09-10 NOTE — PROGRESS NOTES
[facility] / [facility] ([facility]) [address] [facility], [address]    Acknowledgment of Informed Consent for Surgical or Medical Procedure and Sedation  I agree to allow doctor(s) Reta Perkins and his/her associates or assistants, including residents and/or other qualified medical practitioner to perform the following medical treatment or procedure and to administer or direct the administration of sedation as necessary:  Procedure(s): CYSTOSCOPY, [id number]  My doctor has explained the following regarding the proposed procedure:   the explanation of the procedure   the benefits of the procedure   the potential problems that might occur during recuperation   the risks and side effects of the procedure which could include but are not limited to severe blood loss, infection, stroke or death   the benefits, risks and side effect of alternative procedures including the consequences of declining this procedure or any alternative procedures   the likelihood of achieving satisfactory results. I acknowledge no guarantee or assurance has been made to me regarding the results. I understand that during the course of this treatment/procedure, unforeseen conditions can occur which require an additional or different procedure. I agree to allow my physician or assistants to perform such extension of the original procedure as they may find necessary. I understand that sedation will often result in temporary impairment of memory and fine motor skills and that sedation can occasionally progress to a state of deep sedation or general anesthesia. I understand the risks of anesthesia for surgery include, but are not limited to, sore throat, hoarseness, injury to face, mouth, or teeth; nausea; headache; injury to blood vessels or nerves; death, brain damage, or paralysis.     I understand that if I have a Limitation of Treatment order in effect during my hospitalization, the order may or may not be in effect during this procedure. I give my doctor permission to give me blood or blood products. I understand that there are risks with receiving blood such as hepatitis, AIDS, fever, or allergic reaction. I acknowledge that the risks, benefits, and alternatives of this treatment have been explained to me and that no express or implied warranty has been given by the hospital, any blood bank, or any person or entity as to the blood or blood components transfused. At the discretion of my doctor, I agree to allow observers, equipment/product representatives and allow photographing, and/or televising of the procedure, provided my name or identity is maintained confidentially. I agree the hospital may dispose of or use for scientific or educational purposes any tissue, fluid, or body parts which may be removed.     ________________________________Date________Time______ am/pm  ([facility])  Patient or Signature of Closest Relative or Legal Guardian    ________________________________Date________Time______am/pm      Page 1 of  1  Witness

## 2020-09-11 ENCOUNTER — NURSE ONLY (OUTPATIENT)
Dept: PRIMARY CARE CLINIC | Age: 50
End: 2020-09-11
Payer: COMMERCIAL

## 2020-09-11 PROCEDURE — 99211 OFF/OP EST MAY X REQ PHY/QHP: CPT | Performed by: NURSE PRACTITIONER

## 2020-09-12 LAB — SARS-COV-2, NAA: NOT DETECTED

## 2020-09-14 ENCOUNTER — ANESTHESIA EVENT (OUTPATIENT)
Dept: OPERATING ROOM | Age: 50
End: 2020-09-14
Payer: COMMERCIAL

## 2020-09-15 NOTE — PROGRESS NOTES
[facility] PRE-SURGICAL TESTING INSTRUCTIONS                              PRIOR TO PROCEDURE DATE:  1. Please follow any guidelines/instructions prior to your procedure as advised by your surgeon. 2. Arrange for someone to drive you home and be with you for the first 24 hours after discharge for your safety after your procedure for which you received sedation. Ensure it is someone we can share information with regarding your discharge. 3. You must contact your surgeon for instructions IF:   You are taking any blood thinners, aspirin, anti-inflammatory or vitamin E.   There is a change in your physical condition such as a cold, fever, rash, cuts, sores or any other infection, especially near your surgical site. 4. Do not drink alcohol the day before or day of your procedure. 5. A Pre-op History and Physical for surgery MUST be completed by your Physician or Urgent Care within 30 days of your procedure date. Please bring a copy with you on the day of your procedure and along with any other testing performed. THE DAY OF YOUR PROCEDURE:  1. Follow instructions for ARRIVAL TIME as DIRECTED BY YOUR SURGEON. I    2. Enter the MAIN entrance from [employer] and follow the signs to the free [employer] or [employer] parking (offered free of charge 6am-5pm). 3. Enter the Main Entrance of the hospital (do not enter from the lower level of the parking garage). Upon entrance, check in with the  at the main desk on your left. If no one is available at the desk, proceed into the [facility] Waiting Room and go through the door directly into the [facility]. There is a Check-in desk ACROSS from Room 5 (marked with a sign hanging from the ceiling). The phone number for the surgery center is [phone]. 4. Please call [phone] option #2 option #2 if you have not been preregistered yet. On the day of your procedure bring your insurance card and photo ID.  You will be registered at your bedside once brought back to your room. 5. DO NOT EAT ANYTHING eight hours prior to surgery. May have 8 ounces of water 4 hours prior to surgery. 6. MEDICATIONS    Take the following medications with a SMALL sip of water: flomax   Use your usual dose of inhalers the morning of surgery. BRING your rescue inhaler with you to hospital.    Anesthesia does NOT want you to take insulin the morning of surgery. They will control your blood sugar while you are at the hospital. Please contact your ordering physician for instructions regarding your insulin the night before your procedure. If you have an insulin pump, please keep it set on basal rate. 7. Do not swallow water when brushing teeth. No gum, candy, mints or ice chips. Refrain from smoking or at least decrease the amount. 8. Dress in loose, comfortable clothing appropriate for redressing after your procedure. Do not wear jewelry (including body piercings), make-up (especially NO eye make-up), fingernail polish (NO toenail polish if foot/leg surgery), lotion, powders or metal hairclips. 9. Dentures, glasses, or contacts will need to be removed before your procedure. Bring cases for your glasses, contacts, dentures, or hearing aids to protect them while you are in surgery. 10. If you use a CPAP, please bring it with you on the day of your procedure. 11. We recommend that valuable personal  belongings such as cash, cell phones, e-tablets or jewelry, be left at home during your stay. The hospital will not be responsible for valuables that are not secured in the hospital safe. However, if your insurance requires a co-pay, you may want to bring a method of payment, i.e. Check or credit card, if you wish to pay your co-pay the day of surgery. 12. If you are to stay overnight, you may bring a bag with personal items.  Please have any large items you may need brought in by your family after your arrival to your hospital room.    13. If you have a Living Will or Durable Power of , please bring a copy on the day of your procedure. 15. With your permission, one family member may accompany you while you are being prepared for surgery. Once you are ready, additional family members may join you. HOW WE KEEP YOU SAFE and WORK TO PREVENT SURGICAL SITE INFECTIONS:  1. Health care workers should always check your ID bracelet to verify your name and birth date. You will be asked many times to state your name, date of birth, and allergies. 2. Health care workers should always clean their hands with soap or alcohol gel before providing care to you. It is okay to ask anyone if they cleaned their hands before they touch you. 3. You will be actively involved in verifying the type of procedure you are having and ensuring the correct surgical site. This will be confirmed multiple times prior to your procedure. Do NOT daniel your surgery site UNLESS instructed to by your surgeon. 4. Do not shave or wax for 72 hours prior to procedure near your operative site. Shaving with a razor can irritate your skin and make it easier to develop an infection. On the day of your procedure, any hair that needs to be removed near the surgical site will be clipped by a healthcare worker using a special clippers designed to avoid skin irritation. 5. When you are in the operating room, your surgical site will be cleansed with a special soap, and in most cases, you will be given an antibiotic before the surgery begins. What to expect AFTER YOUR PROCEDURE:  1. Immediately following your procedure, your will be taken to the PACU for the first phase of your recovery. Your nurse will help you recover from any potential side effects of anesthesia, such as extreme drowsiness, changes in your vital signs or breathing patterns. Nausea, headache, muscle aches, or sore throat may also occur after anesthesia.   Your nurse will help you manage these potential side effects. 2. For comfort and safety, arrange to have someone at home with you for the first 24 hours after discharge. 3. You and your family will be given written instructions about your diet, activity, dressing care, medications, and return visits. 4. Once at home, should issues with nausea, pain, or bleeding occur, or should you notice any signs of infection, you should call your surgeon. 5. Always clean your hands before and after caring for your wound. Do not let your family touch your surgery site without cleaning their hands. 6. Narcotic pain medications can cause significant constipation. You may want to add a stool softener to your postoperative medication schedule or speak to your surgeon on how best to manage this SIDE EFFECT. SPECIAL INSTRUCTIONS     Thank you for allowing us to care for you. We strive to exceed your expectations in the delivery of care and service provided to you and your family. If you need to contact us for any reason, please call us at [phone]    Instructions reviewed with patient during preadmission testing phone interview. Tara Gallardo. 9/15/2020 .3:15 PM      ADDITIONAL EDUCATIONAL INFORMATION REVIEWED PER PHONE WITH YOU AND/OR YOUR FAMILY:  No Bring a urine sample on day of surgery  Yes and No Pain Goal-Taking Control of Your Pain  Yes FAQs about Surgical Site Infections  No Hibiclens® Bathing Instructions   Yes Antibacterial Soap  No Henrry® Wipes Bathing Instructions (Obtained from: [email or web address]://www.[employer]/. pdf )  No Incentive Spirometer Education  No Other

## 2020-09-17 ENCOUNTER — ANESTHESIA (OUTPATIENT)
Dept: OPERATING ROOM | Age: 50
End: 2020-09-17
Payer: COMMERCIAL

## 2020-09-17 ENCOUNTER — HOSPITAL ENCOUNTER (OUTPATIENT)
Age: 50
Setting detail: OBSERVATION
Discharge: HOME | End: 2020-09-18
Attending: UROLOGY | Admitting: UROLOGY
Payer: COMMERCIAL

## 2020-09-17 VITALS
OXYGEN SATURATION: 93 % | RESPIRATION RATE: 2 BRPM | DIASTOLIC BLOOD PRESSURE: 62 MMHG | TEMPERATURE: 93.2 F | SYSTOLIC BLOOD PRESSURE: 113 MMHG

## 2020-09-17 PROCEDURE — 6360000002 HC RX W HCPCS: Performed by: NURSE ANESTHETIST, CERTIFIED REGISTERED

## 2020-09-17 PROCEDURE — G0378 HOSPITAL OBSERVATION PER HR: HCPCS

## 2020-09-17 PROCEDURE — 88305 TISSUE EXAM BY PATHOLOGIST: CPT

## 2020-09-17 PROCEDURE — 7100000000 HC PACU RECOVERY - FIRST 15 MIN: Performed by: UROLOGY

## 2020-09-17 PROCEDURE — 2580000003 HC RX 258: Performed by: ANESTHESIOLOGY

## 2020-09-17 PROCEDURE — 7100000001 HC PACU RECOVERY - ADDTL 15 MIN: Performed by: UROLOGY

## 2020-09-17 PROCEDURE — 2500000003 HC RX 250 WO HCPCS: Performed by: NURSE ANESTHETIST, CERTIFIED REGISTERED

## 2020-09-17 PROCEDURE — 2709999900 HC NON-CHARGEABLE SUPPLY: Performed by: UROLOGY

## 2020-09-17 PROCEDURE — 2720000010 HC SURG SUPPLY STERILE: Performed by: UROLOGY

## 2020-09-17 PROCEDURE — 2580000003 HC RX 258: Performed by: UROLOGY

## 2020-09-17 PROCEDURE — 3600000003 HC SURGERY LEVEL 3 BASE: Performed by: UROLOGY

## 2020-09-17 PROCEDURE — 3600000013 HC SURGERY LEVEL 3 ADDTL 15MIN: Performed by: UROLOGY

## 2020-09-17 PROCEDURE — 3700000001 HC ADD 15 MINUTES (ANESTHESIA): Performed by: UROLOGY

## 2020-09-17 PROCEDURE — 3700000000 HC ANESTHESIA ATTENDED CARE: Performed by: UROLOGY

## 2020-09-17 RX ORDER — ONDANSETRON 2 MG/ML
4 INJECTION INTRAMUSCULAR; INTRAVENOUS EVERY 6 HOURS PRN
Status: DISCONTINUED | OUTPATIENT
Start: 2020-09-17 | End: 2020-09-18 | Stop reason: HOSPADM

## 2020-09-17 RX ORDER — FENTANYL CITRATE 50 UG/ML
50 INJECTION, SOLUTION INTRAMUSCULAR; INTRAVENOUS EVERY 5 MIN PRN
Status: DISCONTINUED | OUTPATIENT
Start: 2020-09-17 | End: 2020-09-17 | Stop reason: HOSPADM

## 2020-09-17 RX ORDER — EPHEDRINE SULFATE 50 MG/ML
INJECTION INTRAVENOUS PRN
Status: DISCONTINUED | OUTPATIENT
Start: 2020-09-17 | End: 2020-09-17 | Stop reason: SDUPTHER

## 2020-09-17 RX ORDER — ONDANSETRON 2 MG/ML
INJECTION INTRAMUSCULAR; INTRAVENOUS PRN
Status: DISCONTINUED | OUTPATIENT
Start: 2020-09-17 | End: 2020-09-17 | Stop reason: SDUPTHER

## 2020-09-17 RX ORDER — LABETALOL 20 MG/4 ML (5 MG/ML) INTRAVENOUS SYRINGE
5 EVERY 10 MIN PRN
Status: DISCONTINUED | OUTPATIENT
Start: 2020-09-17 | End: 2020-09-17 | Stop reason: HOSPADM

## 2020-09-17 RX ORDER — MEPERIDINE HYDROCHLORIDE 25 MG/ML
12.5 INJECTION INTRAMUSCULAR; INTRAVENOUS; SUBCUTANEOUS EVERY 5 MIN PRN
Status: DISCONTINUED | OUTPATIENT
Start: 2020-09-17 | End: 2020-09-17 | Stop reason: HOSPADM

## 2020-09-17 RX ORDER — HYDROCODONE BITARTRATE AND ACETAMINOPHEN 7.5; 325 MG/1; MG/1
1 TABLET ORAL EVERY 6 HOURS PRN
Status: DISCONTINUED | OUTPATIENT
Start: 2020-09-17 | End: 2020-09-18 | Stop reason: HOSPADM

## 2020-09-17 RX ORDER — PROPOFOL 10 MG/ML
INJECTION, EMULSION INTRAVENOUS PRN
Status: DISCONTINUED | OUTPATIENT
Start: 2020-09-17 | End: 2020-09-17 | Stop reason: SDUPTHER

## 2020-09-17 RX ORDER — LIDOCAINE HYDROCHLORIDE 20 MG/ML
INJECTION, SOLUTION INTRAVENOUS PRN
Status: DISCONTINUED | OUTPATIENT
Start: 2020-09-17 | End: 2020-09-17 | Stop reason: SDUPTHER

## 2020-09-17 RX ORDER — FENTANYL CITRATE 50 UG/ML
INJECTION, SOLUTION INTRAMUSCULAR; INTRAVENOUS PRN
Status: DISCONTINUED | OUTPATIENT
Start: 2020-09-17 | End: 2020-09-17 | Stop reason: SDUPTHER

## 2020-09-17 RX ORDER — CIPROFLOXACIN 2 MG/ML
400 INJECTION, SOLUTION INTRAVENOUS ONCE
Status: DISCONTINUED | OUTPATIENT
Start: 2020-09-17 | End: 2020-09-17 | Stop reason: HOSPADM

## 2020-09-17 RX ORDER — ATROPA BELLADONNA AND OPIUM 16.2; 6 MG/1; MG/1
60 SUPPOSITORY RECTAL EVERY 8 HOURS PRN
Status: DISCONTINUED | OUTPATIENT
Start: 2020-09-17 | End: 2020-09-18 | Stop reason: HOSPADM

## 2020-09-17 RX ORDER — ONDANSETRON 2 MG/ML
4 INJECTION INTRAMUSCULAR; INTRAVENOUS
Status: DISCONTINUED | OUTPATIENT
Start: 2020-09-17 | End: 2020-09-17 | Stop reason: HOSPADM

## 2020-09-17 RX ORDER — DIPHENHYDRAMINE HYDROCHLORIDE 50 MG/ML
12.5 INJECTION INTRAMUSCULAR; INTRAVENOUS
Status: DISCONTINUED | OUTPATIENT
Start: 2020-09-17 | End: 2020-09-17 | Stop reason: HOSPADM

## 2020-09-17 RX ORDER — MIDAZOLAM HYDROCHLORIDE 1 MG/ML
INJECTION INTRAMUSCULAR; INTRAVENOUS PRN
Status: DISCONTINUED | OUTPATIENT
Start: 2020-09-17 | End: 2020-09-17 | Stop reason: SDUPTHER

## 2020-09-17 RX ORDER — FENTANYL CITRATE 50 UG/ML
25 INJECTION, SOLUTION INTRAMUSCULAR; INTRAVENOUS EVERY 5 MIN PRN
Status: DISCONTINUED | OUTPATIENT
Start: 2020-09-17 | End: 2020-09-17 | Stop reason: HOSPADM

## 2020-09-17 RX ORDER — OXYCODONE HYDROCHLORIDE AND ACETAMINOPHEN 5; 325 MG/1; MG/1
1 TABLET ORAL PRN
Status: DISCONTINUED | OUTPATIENT
Start: 2020-09-17 | End: 2020-09-17 | Stop reason: HOSPADM

## 2020-09-17 RX ORDER — OXYCODONE HYDROCHLORIDE AND ACETAMINOPHEN 5; 325 MG/1; MG/1
2 TABLET ORAL PRN
Status: DISCONTINUED | OUTPATIENT
Start: 2020-09-17 | End: 2020-09-17 | Stop reason: HOSPADM

## 2020-09-17 RX ORDER — HYDRALAZINE HYDROCHLORIDE 20 MG/ML
5 INJECTION INTRAMUSCULAR; INTRAVENOUS EVERY 10 MIN PRN
Status: DISCONTINUED | OUTPATIENT
Start: 2020-09-17 | End: 2020-09-17 | Stop reason: HOSPADM

## 2020-09-17 RX ORDER — MORPHINE SULFATE 2 MG/ML
2 INJECTION, SOLUTION INTRAMUSCULAR; INTRAVENOUS EVERY 4 HOURS PRN
Status: DISCONTINUED | OUTPATIENT
Start: 2020-09-17 | End: 2020-09-18 | Stop reason: HOSPADM

## 2020-09-17 RX ORDER — PROCHLORPERAZINE EDISYLATE 5 MG/ML
5 INJECTION INTRAMUSCULAR; INTRAVENOUS
Status: DISCONTINUED | OUTPATIENT
Start: 2020-09-17 | End: 2020-09-17 | Stop reason: HOSPADM

## 2020-09-17 RX ORDER — SODIUM CHLORIDE, SODIUM LACTATE, POTASSIUM CHLORIDE, CALCIUM CHLORIDE 600; 310; 30; 20 MG/100ML; MG/100ML; MG/100ML; MG/100ML
INJECTION, SOLUTION INTRAVENOUS CONTINUOUS
Status: DISCONTINUED | OUTPATIENT
Start: 2020-09-17 | End: 2020-09-17

## 2020-09-17 RX ORDER — MAGNESIUM HYDROXIDE 1200 MG/15ML
LIQUID ORAL CONTINUOUS PRN
Status: COMPLETED | OUTPATIENT
Start: 2020-09-17 | End: 2020-09-17

## 2020-09-17 RX ADMIN — MIDAZOLAM HYDROCHLORIDE 2 MG: 2 INJECTION, SOLUTION INTRAMUSCULAR; INTRAVENOUS at 08:42

## 2020-09-17 RX ADMIN — FENTANYL CITRATE 100 MCG: 50 INJECTION INTRAMUSCULAR; INTRAVENOUS at 08:53

## 2020-09-17 RX ADMIN — SODIUM CHLORIDE, SODIUM LACTATE, POTASSIUM CHLORIDE, AND CALCIUM CHLORIDE: 600; 310; 30; 20 INJECTION, SOLUTION INTRAVENOUS at 08:10

## 2020-09-17 RX ADMIN — SODIUM CHLORIDE, SODIUM LACTATE, POTASSIUM CHLORIDE, AND CALCIUM CHLORIDE: 600; 310; 30; 20 INJECTION, SOLUTION INTRAVENOUS at 08:42

## 2020-09-17 RX ADMIN — PROPOFOL 300 MG: 10 INJECTION, EMULSION INTRAVENOUS at 08:53

## 2020-09-17 RX ADMIN — ONDANSETRON 4 MG: 2 INJECTION INTRAMUSCULAR; INTRAVENOUS at 10:10

## 2020-09-17 RX ADMIN — EPHEDRINE SULFATE 20 MG: 50 INJECTION INTRAVENOUS at 09:37

## 2020-09-17 RX ADMIN — LIDOCAINE HYDROCHLORIDE 100 MG: 20 INJECTION, SOLUTION INTRAVENOUS at 08:53

## 2020-09-17 ASSESSMENT — PAIN - FUNCTIONAL ASSESSMENT: PAIN_FUNCTIONAL_ASSESSMENT: 0-10

## 2020-09-17 ASSESSMENT — PAIN SCALES - GENERAL
PAINLEVEL_OUTOF10: 0

## 2020-09-17 ASSESSMENT — PULMONARY FUNCTION TESTS
PIF_VALUE: 3
PIF_VALUE: 0
PIF_VALUE: 10
PIF_VALUE: 10
PIF_VALUE: 9
PIF_VALUE: 10
PIF_VALUE: 3
PIF_VALUE: 4
PIF_VALUE: 0
PIF_VALUE: 3
PIF_VALUE: 4
PIF_VALUE: 3
PIF_VALUE: 3
PIF_VALUE: 6
PIF_VALUE: 3
PIF_VALUE: 9
PIF_VALUE: 4
PIF_VALUE: 3
PIF_VALUE: 4
PIF_VALUE: 3
PIF_VALUE: 12
PIF_VALUE: 3
PIF_VALUE: 4
PIF_VALUE: 14
PIF_VALUE: 11
PIF_VALUE: 3
PIF_VALUE: 3
PIF_VALUE: 0
PIF_VALUE: 1
PIF_VALUE: 3
PIF_VALUE: 3
PIF_VALUE: 27
PIF_VALUE: 0
PIF_VALUE: 1
PIF_VALUE: 5
PIF_VALUE: 4
PIF_VALUE: 1
PIF_VALUE: 3
PIF_VALUE: 15
PIF_VALUE: 1
PIF_VALUE: 4
PIF_VALUE: 6
PIF_VALUE: 3
PIF_VALUE: 3
PIF_VALUE: 5
PIF_VALUE: 15
PIF_VALUE: 3
PIF_VALUE: 4
PIF_VALUE: 14
PIF_VALUE: 3
PIF_VALUE: 10
PIF_VALUE: 3
PIF_VALUE: 3
PIF_VALUE: 4
PIF_VALUE: 3
PIF_VALUE: 1
PIF_VALUE: 10
PIF_VALUE: 3
PIF_VALUE: 4
PIF_VALUE: 4
PIF_VALUE: 12
PIF_VALUE: 10
PIF_VALUE: 8
PIF_VALUE: 4
PIF_VALUE: 3
PIF_VALUE: 0
PIF_VALUE: 3
PIF_VALUE: 2
PIF_VALUE: 24
PIF_VALUE: 3
PIF_VALUE: 4
PIF_VALUE: 4
PIF_VALUE: 10
PIF_VALUE: 14
PIF_VALUE: 0
PIF_VALUE: 8
PIF_VALUE: 2
PIF_VALUE: 12
PIF_VALUE: 4
PIF_VALUE: 3
PIF_VALUE: 4
PIF_VALUE: 3
PIF_VALUE: 10
PIF_VALUE: 3
PIF_VALUE: 0
PIF_VALUE: 5
PIF_VALUE: 12
PIF_VALUE: 5
PIF_VALUE: 3
PIF_VALUE: 3
PIF_VALUE: 5
PIF_VALUE: 4

## 2020-09-17 ASSESSMENT — LIFESTYLE VARIABLES: SMOKING_STATUS: 1

## 2020-09-17 ASSESSMENT — ENCOUNTER SYMPTOMS: SHORTNESS OF BREATH: 0

## 2020-09-17 NOTE — ANESTHESIA POSTPROCEDURE EVALUATION
Department of Anesthesiology  Postprocedure Note    Patient: Jesu Olivares  MRN: [id number]  YOB: 1970  Date of evaluation: 9/17/2020  Time:  12:01 PM     Procedure Summary     Date:  09/17/20 Room / Location:  [address] [place] [address]    Anesthesia Start:  9998 Anesthesia Stop:  6172    Procedure:  CYSTOSCOPY, BIPOLAR TRANSURETHRAL RESECTION PROSTATE (N/A ) Diagnosis:  (benign prostatic hyperplasia and urinary retention)    Surgeon:  Tala Ponce MD Responsible Provider:  Cris Honeycutt MD    Anesthesia Type:  general ASA Status:  3          Anesthesia Type: No value filed. Cayetano Phase I: Cayetano Score: 8    Cayetano Phase II:      Last vitals: Reviewed and per EMR flowsheets.        Anesthesia Post Evaluation    Patient location during evaluation: PACU  Patient participation: complete - patient participated  Level of consciousness: awake  Pain score: 2  Airway patency: patent  Nausea & Vomiting: no nausea and no vomiting  Complications: no  Cardiovascular status: bradycardic  Respiratory status: acceptable  Hydration status: euvolemic

## 2020-09-17 NOTE — PROGRESS NOTES
PACU Transfer Note    Vitals:    09/17/20 1245   BP: 129/87   Pulse: (!) 43   Resp: 19   Temp: 97 °F (36.1 °C)   SpO2: 100%   HR 53 on admission. Dr Sammi Coronel aware. In: 1241 [P.O.:222; I.V.:1222]  Out: -     Pain assessment:  none  Pain Level: 0    Report given to Receiving unit ИВАН - Jp Perry.     9/17/2020 12:53 PM

## 2020-09-17 NOTE — ANESTHESIA PRE PROCEDURE
 Alcohol use: Yes     Comment: occasional                                Ready to quit: Not Answered  Counseling given: Not Answered      Vital Signs (Current):   Vitals:    09/15/20 1513 09/17/20 0717   BP:  114/72   Pulse:  53   Resp:  18   Temp:  98.1 °F (36.7 °C)   TempSrc:  Oral   SpO2:  97%   Weight: 195 lb (88.5 kg) 195 lb (88.5 kg)   Height: 6' 1\" (1.854 m) 6' 1\" (1.854 m)                                              BP Readings from Last 3 Encounters:   09/17/20 114/72   09/01/20 135/87   08/28/20 (!) 110/58       NPO Status: Time of last liquid consumption: 0200                        Time of last solid consumption: 2100                        Date of last liquid consumption: 09/17/20                        Date of last solid food consumption: 09/16/20    BMI:   Wt Readings from Last 3 Encounters:   09/17/20 195 lb (88.5 kg)   09/01/20 199 lb (90.3 kg)   08/28/20 195 lb 9.6 oz (88.7 kg)     Body mass index is 25.73 kg/m². CBC: No results found for: WBC, RBC, HGB, HCT, MCV, RDW, PLT    CMP:   Lab Results   Component Value Date     08/28/2020    K 4.5 08/28/2020    K 4.4 07/27/2020     08/28/2020    CO2 25 08/28/2020    BUN 15 08/28/2020    CREATININE 1.4 08/28/2020    GFRAA >60 08/28/2020    AGRATIO 1.6 07/24/2020    LABGLOM 53 08/28/2020    GLUCOSE 93 08/28/2020    PROT 7.2 07/24/2020    CALCIUM 9.4 08/28/2020    BILITOT <0.2 07/24/2020    ALKPHOS 63 07/24/2020    AST 20 07/24/2020    ALT 12 07/24/2020       POC Tests: No results for input(s): POCGLU, POCNA, POCK, POCCL, POCBUN, POCHEMO, POCHCT in the last 72 hours.     Coags: No results found for: PROTIME, INR, APTT    HCG (If Applicable): No results found for: PREGTESTUR, PREGSERUM, HCG, HCGQUANT     ABGs: No results found for: PHART, PO2ART, XNJ3HAN, HPP6FAB, BEART, P3PKQUTH     Type & Screen (If Applicable):  No results found for: LABABO, LABRH    Drug/Infectious Status (If Applicable):  No results found for: HIV, HEPCAB    COVID-19 Screening (If Applicable):   Lab Results   Component Value Date    COVID19 NOT DETECTED 09/11/2020         Anesthesia Evaluation    Airway: Mallampati: I  TM distance: >3 FB   Neck ROM: full  Mouth opening: > = 3 FB Dental:          Pulmonary:   (+) COPD:  current smoker    (-) asthma and shortness of breath                           Cardiovascular:  Exercise tolerance: poor (<4 METS),       (-) hypertension, past MI and  angina                Neuro/Psych:      (-) seizures and CVA           GI/Hepatic/Renal:   (+) GERD:,           Endo/Other:    (+) Diabetes, . Abdominal:           Vascular:                                      Anesthesia Plan      general     ASA 3     (-npo MN  -denies chest pain or palp. Active  -h/o Etoh, marajuana)  Induction: intravenous. MIPS: Postoperative opioids intended. Anesthetic plan and risks discussed with patient. Plan discussed with CRNA.                 Evan Erwin MD   9/17/2020

## 2020-09-17 NOTE — PROGRESS NOTES
Pt arrived asleep no SS of pain or nausea auditory moist upper air way sounds pt resisting opening oral air way for suctioning report received from crna

## 2020-09-17 NOTE — PROGRESS NOTES
4 Eyes Admission Assessment     I agree as the admission nurse that 2 RN's have performed a thorough Head to Toe Skin Assessment on the patient. ALL assessment sites listed below have been assessed on admission. Areas assessed by both nurses:   [x]   Head, Face, and Ears   [x]   Shoulders, Back, and Chest  [x]   Arms, Elbows, and Hands   [x]   Coccyx, Sacrum, and Ischium  [x]   Legs, Feet, and Heels        Does the Patient have Skin Breakdown?   No         Chad Prevention initiated:  NA   Wound Care Orders initiated:  NA      WO nurse consulted for Pressure Injury (Stage 3,4, Unstageable, DTI, NWPT, and Complex wounds) or Chad score 18 or lower:  NA      Nurse 1 eSignature: Electronically signed by Bowen Baird RN on 9/17/20 at 3:32 PM EDT    **SHARE this note so that the co-signing nurse is able to place an eSignature**    Nurse 2 eSignature: Electronically signed by Cheri Dolan RN on 9/17/20 at 3:33 PM EDT
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
## 2020-09-17 NOTE — PROGRESS NOTES
Dr Meng Has called about HR in 45s. 42 at time of call. Patient awakens easily and reports just groggy. HR 53 on admission. Ok to go to room.

## 2020-09-17 NOTE — OP NOTE
[address]                [address]                                OPERATIVE REPORT    PATIENT NAME: Jefe Hall                      :        1970  MED REC NO:   [id number]                          ROOM:       1854  ACCOUNT NO:   [de-identified]                           ADMIT DATE: 2020  PROVIDER:     Radhames Lockhart MD    DATE OF PROCEDURE:  2020    PREOPERATIVE DIAGNOSIS:  Benign prostatic hypertrophy with incomplete  bladder emptying. POSTOPERATIVE DIAGNOSIS:  Benign prostatic hypertrophy with incomplete  bladder emptying. PROCEDURE PERFORMED:  Transurethral resection of the prostate. SURGEON:  Radhames Lockhart MD    ANESTHESIA:  General.    ANTIBIOTICS:  Ciprofloxacin 400 mg IV once. ESTIMATED BLOOD LOSS:  75 mL. SPECIMENS:  Prostate tissue. COMPLICATIONS:  None. INDICATION FOR PROCEDURE:  The patient is a very pleasant 68-year-old  gentleman who was previously admitted to the [facility] with  urinary retention and renal insufficiency. He was started on Flomax and  had an indwelling catheter for a while. He has now been rendered  catheter free, but continues to have incomplete bladder emptying and  obstructive urinary symptoms. He had a benign rectal exam in 2020. PSA of only 5.79 in the setting of urinary retention. He underwent a  prior cystoscopy under my care, which revealed trilobar prostatic  hypertrophy with a large median lobe component and a very large prostate  gland. I did not feel that he would be a good candidate for a UroLift  procedure. I have counseled him for TURP explaining risks of bleeding,  infection, sexual dysfunction including erectile dysfunction, urinary  incontinence, urethral stricturing, and bladder neck contracture. He  wishes to proceed.     DESCRIPTION OF PROCEDURE:  After informed consent, the patient was taken  to the operating room and placed under anesthesia. He was then  repositioned into dorsal lithotomy and the genitalia were prepped and  draped in the usual sterile fashion. Thereafter, a rigid resectoscope  was advanced per urethra and into the bladder. The urethra and bladder  were completely and systematically surveyed and noted to be free of any  suspicious or concerning lesions. Bilateral ureteral orifices were  noted to be in normal anatomic position along the trigonal ridge. The  prostate was again notable for trilobar hypertrophy with a large median  lobe component. I began resection using a loop resection element, first  working with the median lobe resecting from the bladder neck to the  verumontanum taking care at all times not to extend the resection beyond  the verumontanum. I then continued resection of the left and right  lateral lobes in the same fashion and ended resection with the anterior  tissue. The bladder was then evacuated of all prostate chips, which was  visually confirmed again with examination using the scope. This was  done with an Camrivox evacuator and required several evacuations to get all  the chips clear. I then achieved hemostasis with electrocautery. I  filled the patient's bladder through the scope, removed the scope and  the patient was noted to have a strong expression stream.  I then placed  a 22-Cypriot three-way Schmitz catheter and inflated the balloon with 30 mL  of sterile water and then placed the catheter on traction by tying a 4x4  gauze around the catheter and cinching the balloon against the prostate. The catheter was noted to be draining completely clear to pale pink  urine. This was connected to continuous bladder irrigation. The  patient was taken to Recovery in stable condition having tolerated the  procedure well. POSTPROCEDURAL PLAN:  The patient's catheter can be removed in the  morning.   If he is able to void and empty well, then he can followup  outpatient in about one month.        Johanny Licona MD    D: 09/17/2020 10:30:20       T: 09/17/2020 12:01:03     [id number]  Job#: [id number]     Doc#: [id number]    CC:

## 2020-09-17 NOTE — BRIEF OP NOTE
Brief Postoperative Note      Patient: Sheila Ortega  YOB: 1970  MRN: [id number]    Date of Procedure: 9/17/2020    Pre-Op Diagnosis: benign prostatic hyperplasia and urinary retention    Post-Op Diagnosis: Same       Procedure(s):  CYSTOSCOPY, BIPOLAR TRANSURETHRAL RESECTION PROSTATE    Surgeon(s):  José Manuel Jackson MD    Assistant:  * No surgical staff found *    Anesthesia: General    Estimated Blood Loss (mL): less than 511     Complications: None    Specimens:   ID Type Source Tests Collected by Time Destination   A : PROSTATE TISSUE Tissue Tissue SURGICAL PATHOLOGY José Manuel Jackson MD 9/17/2020 0930        Implants:  * No implants in log *      Drains:   Urethral Catheter Double-lumen (Active)       Findings: Trilobar prostate hypertrophy    JOB: [id number]    Electronically signed by José Manuel Jackson MD on 9/17/2020 at 10:23 AM

## 2020-09-18 VITALS
HEIGHT: 73 IN | HEART RATE: 51 BPM | WEIGHT: 195 LBS | OXYGEN SATURATION: 97 % | SYSTOLIC BLOOD PRESSURE: 109 MMHG | BODY MASS INDEX: 25.84 KG/M2 | RESPIRATION RATE: 16 BRPM | DIASTOLIC BLOOD PRESSURE: 73 MMHG | TEMPERATURE: 98.5 F

## 2020-09-18 LAB
ANION GAP SERPL CALCULATED.3IONS-SCNC: 9 MMOL/L (ref 3–16)
BUN BLDV-MCNC: 15 MG/DL (ref 7–20)
CALCIUM SERPL-MCNC: 8.8 MG/DL (ref 8.3–10.6)
CHLORIDE BLD-SCNC: 102 MMOL/L (ref 99–110)
CO2: 25 MMOL/L (ref 21–32)
CREAT SERPL-MCNC: 1.3 MG/DL (ref 0.9–1.3)
GFR AFRICAN AMERICAN: >60
GFR NON-AFRICAN AMERICAN: 58
GLUCOSE BLD-MCNC: 121 MG/DL (ref 70–99)
HCT VFR BLD CALC: 34.6 % (ref 40.5–52.5)
HEMOGLOBIN: 11.6 G/DL (ref 13.5–17.5)
MCH RBC QN AUTO: 27.7 PG (ref 26–34)
MCHC RBC AUTO-ENTMCNC: 33.5 G/DL (ref 31–36)
MCV RBC AUTO: 82.7 FL (ref 80–100)
PDW BLD-RTO: 16.1 % (ref 12.4–15.4)
PLATELET # BLD: 273 K/UL (ref 135–450)
PMV BLD AUTO: 7.9 FL (ref 5–10.5)
POTASSIUM SERPL-SCNC: 4.2 MMOL/L (ref 3.5–5.1)
RBC # BLD: 4.18 M/UL (ref 4.2–5.9)
SODIUM BLD-SCNC: 136 MMOL/L (ref 136–145)
WBC # BLD: 8.8 K/UL (ref 4–11)

## 2020-09-18 PROCEDURE — 36415 COLL VENOUS BLD VENIPUNCTURE: CPT

## 2020-09-18 PROCEDURE — 51798 US URINE CAPACITY MEASURE: CPT

## 2020-09-18 PROCEDURE — 85027 COMPLETE CBC AUTOMATED: CPT

## 2020-09-18 PROCEDURE — G0378 HOSPITAL OBSERVATION PER HR: HCPCS

## 2020-09-18 PROCEDURE — 80048 BASIC METABOLIC PNL TOTAL CA: CPT

## 2020-09-18 RX ORDER — DOCUSATE SODIUM 100 MG/1
100 CAPSULE, LIQUID FILLED ORAL 2 TIMES DAILY PRN
Qty: 60 CAPSULE | Refills: 0 | Status: SHIPPED | OUTPATIENT
Start: 2020-09-18 | End: 2021-01-11

## 2020-09-18 RX ORDER — HYDROCODONE BITARTRATE AND ACETAMINOPHEN 5; 325 MG/1; MG/1
1 TABLET ORAL EVERY 6 HOURS PRN
Qty: 10 TABLET | Refills: 0 | Status: SHIPPED | OUTPATIENT
Start: 2020-09-18 | End: 2020-09-21

## 2020-09-18 ASSESSMENT — PAIN SCALES - GENERAL: PAINLEVEL_OUTOF10: 0

## 2020-09-18 NOTE — PROGRESS NOTES
Urology Attending Progress Note      Subjective: No  complaints    Vitals:  /61   Pulse 56   Temp 98.7 °F (37.1 °C) (Oral)   Resp 16   Ht 6' 1\" (1.854 m)   Wt 195 lb (88.5 kg)   SpO2 100%   BMI 25.73 kg/m²   Temp  Av °F (36.7 °C)  Min: 97 °F (36.1 °C)  Max: 98.7 °F (37.1 °C)    Intake/Output Summary (Last 24 hours) at 2020 1052  Last data filed at 2020 0839  Gross per 24 hour   Intake 1044 ml   Output -9150 ml   Net 50303 ml       Exam: Schmitz draining clear urine off CBI    Labs:  WBC:    Lab Results   Component Value Date    WBC 8.8 2020     Hemoglobin/Hematocrit:    Lab Results   Component Value Date    HGB 11.6 2020    HCT 34.6 2020     BMP:    Lab Results   Component Value Date     2020    K 4.2 2020    K 4.4 2020     2020    CO2 25 2020    BUN 15 2020    LABALBU 4.4 2020    CREATININE 1.3 2020    CALCIUM 8.8 2020    GFRAA >60 2020    LABGLOM 58 2020     PT/INR:  No results found for: PROTIME, INR  PTT:  No results found for: APTT[APTT    Antibiotic Therapy: None    Imaging: No     Impression/Plan: 45 yo M POD #1 TURP   -Cshmitz draining clear urine off CBI.  Schmitz removed now for voiding trial.  -Labs WNL  -Possible discharge later today after he voids.  -Follow up in 1-2 weeks for bladder scan    CABRERA Au - CNP

## 2020-09-18 NOTE — PLAN OF CARE
Problem: Urinary Elimination:  Goal: Ability to achieve a balanced intake and output will improve  Description: Ability to achieve a balanced intake and output will improve  Note: Pt tolerating PO intake well. CBI continuing to irrigate. Urine pink w/ sediment present. Goal is to clamp this morning around 0700.

## 2020-09-18 NOTE — CARE COORDINATION
Case Management Assessment            Discharge Note                    Date / Time of Note: 9/18/2020 12:23 PM                  Discharge Note Completed by: Rojas Mistry    Patient Name: Launie Sicard   YOB: 1970  Diagnosis: BPH with obstruction/lower urinary tract symptoms [N40.1, N13.8]   Date / Time: 9/17/2020  6:46 AM    Current PCP: Jonna Abbott, DO  Clinic patient: No    Hospitalization in the last 30 days: No    Advance Directives:  Code Status: Prior  [address] [place] [address] Dr DNR form completed and on chart: No    Financial:  Payor: Lucy Sales / Plan: Mark Stewart / Product Type: *No Product type* /      Pharmacy:    [place], [address] [facility] [address]. Catrina Cottrell [phone] - f [phone]  [address] [facility] [address]  Phone: [phone] Fax: [phone]    [id number] [address] [facility] [phone] - f [phone]  [address] [place] [address] Av  Phone: [phone] Fax: [phone]      Assistance purchasing medications?:    Assistance provided by Case Management: None at this time    Does patient want to participate in local refill/ meds to beds program?:      Meds To Beds General Rules:  1. Can ONLY be done Monday- Friday between 8:30am-5pm  2. Prescription(s) must be in pharmacy by 3pm to be filled same day  3. Copy of patient's insurance/ prescription drug card and patient face sheet must be sent along with the prescription(s)  4. Cost of Rx cannot be added to hospital bill. If financial assistance is needed, please contact unit  or ;  or  CANNOT provide pharmacy voucher for patients co-pays  5.  Patients can then  the prescription on their way out of the hospital at discharge, or pharmacy can deliver to the bedside if staff is available. (payment due at time of pick-up or delivery - cash, check, or card accepted)     Able to afford home medications/ co-pay costs: Yes    ADLS:  Current PT AM-PAC Score:   /24  Current OT AM-PAC Score:   /24      DISCHARGE Disposition: Home- No Services Needed    LOC at discharge: Not Applicable  GEMINI Completed: Not Indicated    Notification completed in HENS/PAS?:  Not Applicable    IMM Completed:   Not Indicated    Transportation:  Transportation PLAN for discharge: family   Mode of Transport: [address]  ordered at discharge: Not [address]: Not Applicable  Orders faxed: No    Durable Medical Equipment:  DME Provider: none  Equipment obtained during hospitalization:     Home Oxygen and Respiratory Equipment:  Oxygen needed at discharge?: Not [address]: Not Applicable  Portable tank available for discharge?: Not Indicated    Dialysis:  Dialysis patient: No    Dialysis Center:  Not Applicable      Additional CM Notes: Pt from home will return home no needs    The Plan for Transition of Care is related to the following treatment goals of BPH with obstruction/lower urinary tract symptoms [N40.1, N13.8]    The Patient and/or patient representative Anuel and his family were provided with a choice of provider and agrees with the discharge plan Yes    Freedom of choice list was provided with basic dialogue that supports the patient's individualized plan of care/goals and shares the quality data associated with the providers.  Not Indicated    Care Transitions patient: No    Neftaly Marcos RN  [facility]  Case Management Department  Ph: [phone]  Fax: [phone]

## 2021-01-11 DIAGNOSIS — N17.9 AKI (ACUTE KIDNEY INJURY) (HCC): ICD-10-CM

## 2021-01-12 LAB
ALBUMIN SERPL-MCNC: 4.4 G/DL (ref 3.4–5)
ANION GAP SERPL CALCULATED.3IONS-SCNC: 15 MMOL/L (ref 3–16)
BUN BLDV-MCNC: 17 MG/DL (ref 7–20)
CALCIUM SERPL-MCNC: 9.8 MG/DL (ref 8.3–10.6)
CHLORIDE BLD-SCNC: 101 MMOL/L (ref 99–110)
CO2: 25 MMOL/L (ref 21–32)
CREAT SERPL-MCNC: 1.3 MG/DL (ref 0.9–1.3)
GFR AFRICAN AMERICAN: >60
GFR NON-AFRICAN AMERICAN: 58
GLUCOSE BLD-MCNC: 88 MG/DL (ref 70–99)
PHOSPHORUS: 3.8 MG/DL (ref 2.5–4.9)
POTASSIUM SERPL-SCNC: 4.7 MMOL/L (ref 3.5–5.1)
SODIUM BLD-SCNC: 141 MMOL/L (ref 136–145)

## 2021-06-28 ENCOUNTER — TELEPHONE (OUTPATIENT)
Dept: PRIMARY CARE CLINIC | Age: 51
End: 2021-06-28

## 2021-06-28 NOTE — TELEPHONE ENCOUNTER
Please let patient know he is due for a physical with Dr. Manuel Morton- encourage to set up for late July/August if possible. Thank you.

## 2021-07-22 ENCOUNTER — OFFICE VISIT (OUTPATIENT)
Dept: PRIMARY CARE CLINIC | Age: 51
End: 2021-07-22
Payer: COMMERCIAL

## 2021-07-22 VITALS
HEIGHT: 73 IN | TEMPERATURE: 96.8 F | HEART RATE: 52 BPM | WEIGHT: 196 LBS | DIASTOLIC BLOOD PRESSURE: 77 MMHG | BODY MASS INDEX: 25.98 KG/M2 | SYSTOLIC BLOOD PRESSURE: 119 MMHG | OXYGEN SATURATION: 100 %

## 2021-07-22 DIAGNOSIS — Z82.61 FAMILY HISTORY OF RHEUMATOID ARTHRITIS: ICD-10-CM

## 2021-07-22 DIAGNOSIS — Z12.5 SCREENING FOR PROSTATE CANCER: ICD-10-CM

## 2021-07-22 DIAGNOSIS — Z12.11 SCREENING FOR COLON CANCER: ICD-10-CM

## 2021-07-22 DIAGNOSIS — Z00.00 ANNUAL PHYSICAL EXAM: Primary | ICD-10-CM

## 2021-07-22 DIAGNOSIS — M19.041 ARTHRITIS OF BOTH HANDS: ICD-10-CM

## 2021-07-22 DIAGNOSIS — Z86.2 HISTORY OF ANEMIA: ICD-10-CM

## 2021-07-22 DIAGNOSIS — M19.042 ARTHRITIS OF BOTH HANDS: ICD-10-CM

## 2021-07-22 DIAGNOSIS — M17.12 ARTHRITIS OF LEFT KNEE: ICD-10-CM

## 2021-07-22 DIAGNOSIS — Z11.59 NEED FOR HEPATITIS C SCREENING TEST: ICD-10-CM

## 2021-07-22 DIAGNOSIS — S60.10XA SUBUNGUAL HEMATOMA OF FINGER, INITIAL ENCOUNTER: ICD-10-CM

## 2021-07-22 DIAGNOSIS — H61.23 BILATERAL IMPACTED CERUMEN: ICD-10-CM

## 2021-07-22 DIAGNOSIS — Z90.79 S/P TURP (STATUS POST TRANSURETHRAL RESECTION OF PROSTATE): ICD-10-CM

## 2021-07-22 PROCEDURE — 3017F COLORECTAL CA SCREEN DOC REV: CPT | Performed by: FAMILY MEDICINE

## 2021-07-22 PROCEDURE — G8419 CALC BMI OUT NRM PARAM NOF/U: HCPCS | Performed by: FAMILY MEDICINE

## 2021-07-22 PROCEDURE — 99213 OFFICE O/P EST LOW 20 MIN: CPT | Performed by: FAMILY MEDICINE

## 2021-07-22 PROCEDURE — G8427 DOCREV CUR MEDS BY ELIG CLIN: HCPCS | Performed by: FAMILY MEDICINE

## 2021-07-22 PROCEDURE — 99396 PREV VISIT EST AGE 40-64: CPT | Performed by: FAMILY MEDICINE

## 2021-07-22 PROCEDURE — 4004F PT TOBACCO SCREEN RCVD TLK: CPT | Performed by: FAMILY MEDICINE

## 2021-07-22 ASSESSMENT — PATIENT HEALTH QUESTIONNAIRE - PHQ9
SUM OF ALL RESPONSES TO PHQ QUESTIONS 1-9: 6
4. FEELING TIRED OR HAVING LITTLE ENERGY: 0
5. POOR APPETITE OR OVEREATING: 0
1. LITTLE INTEREST OR PLEASURE IN DOING THINGS: 1
6. FEELING BAD ABOUT YOURSELF - OR THAT YOU ARE A FAILURE OR HAVE LET YOURSELF OR YOUR FAMILY DOWN: 0
7. TROUBLE CONCENTRATING ON THINGS, SUCH AS READING THE NEWSPAPER OR WATCHING TELEVISION: 0
10. IF YOU CHECKED OFF ANY PROBLEMS, HOW DIFFICULT HAVE THESE PROBLEMS MADE IT FOR YOU TO DO YOUR WORK, TAKE CARE OF THINGS AT HOME, OR GET ALONG WITH OTHER PEOPLE: 1
SUM OF ALL RESPONSES TO PHQ QUESTIONS 1-9: 6
3. TROUBLE FALLING OR STAYING ASLEEP: 3
2. FEELING DOWN, DEPRESSED OR HOPELESS: 2
SUM OF ALL RESPONSES TO PHQ9 QUESTIONS 1 & 2: 3
9. THOUGHTS THAT YOU WOULD BE BETTER OFF DEAD, OR OF HURTING YOURSELF: 0
SUM OF ALL RESPONSES TO PHQ QUESTIONS 1-9: 6
8. MOVING OR SPEAKING SO SLOWLY THAT OTHER PEOPLE COULD HAVE NOTICED. OR THE OPPOSITE, BEING SO FIGETY OR RESTLESS THAT YOU HAVE BEEN MOVING AROUND A LOT MORE THAN USUAL: 0
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
## 2021-07-25 ASSESSMENT — ENCOUNTER SYMPTOMS
SHORTNESS OF BREATH: 0
EYE ITCHING: 0
CONSTIPATION: 0
DIARRHEA: 0
NAUSEA: 0
COUGH: 0
SORE THROAT: 0
EYE PAIN: 0
ABDOMINAL PAIN: 0
VOMITING: 0
WHEEZING: 0

## (undated) DEVICE — CATHETER URETH 22FR 30CC BLLN F 3 W SPEC M RND TIP TWO

## (undated) DEVICE — TRAY PREP DRY W/ PREM GLV 2 APPL 6 SPNG 2 UNDPD 1 OVERWRAP

## (undated) DEVICE — JELLY LUBE BTL MDS032275 4OZ

## (undated) DEVICE — GAUZE,SPONGE,4"X4",16PLY,XRAY,STRL,LF: Brand: MEDLINE

## (undated) DEVICE — Z INACTIVE USE 2660664 SOLUTION IRRIG 3000ML 0.9% SOD CHL USP UROMATIC PLAS CONT

## (undated) DEVICE — BAG DRNGE 2000ML UROLOGY ANTI REFLX CHMBR SAMP PRT

## (undated) DEVICE — GARMENT,MEDLINE,DVT,INT,CALF,MED, GEN2: Brand: MEDLINE

## (undated) DEVICE — PREP TRAY 10X5X2: Brand: MEDLINE INDUSTRIES, INC.

## (undated) DEVICE — TUBING IRRIG L96IN DIA0.241IN L BOR T-U-R W/ NVENT PIERCING

## (undated) DEVICE — SOLUTION IV IRRIG WATER 1000ML POUR BRL 2F7114

## (undated) DEVICE — SPONGE GZ W4XL4IN COT 12 PLY TYP VII WVN C FLD DSGN

## (undated) DEVICE — GLOVE SURG SZ 65 THK91MIL LTX FREE SYN POLYISOPRENE

## (undated) DEVICE — PACK,CYSTOSCOPY,PK III,AURORA: Brand: MEDLINE

## (undated) DEVICE — SYRINGE MED 30ML STD CLR PLAS LUERLOCK TIP N CTRL DISP

## (undated) DEVICE — HF-RESECTION ELECTRODE PLASMALOOP LOOP, LARGE, 24 FR., 12°/16°, ESG TURIS: Brand: OLYMPUS

## (undated) DEVICE — BASIC SINGLE BASIN 1-LF: Brand: MEDLINE INDUSTRIES, INC.

## (undated) DEVICE — 60 ML SYRINGE,CATHETER TIP: Brand: MONOJECT